# Patient Record
Sex: MALE | Race: WHITE | NOT HISPANIC OR LATINO | Employment: OTHER | URBAN - METROPOLITAN AREA
[De-identification: names, ages, dates, MRNs, and addresses within clinical notes are randomized per-mention and may not be internally consistent; named-entity substitution may affect disease eponyms.]

---

## 2017-01-05 ENCOUNTER — APPOINTMENT (OUTPATIENT)
Dept: LAB | Facility: HOSPITAL | Age: 82
End: 2017-01-05
Attending: INTERNAL MEDICINE
Payer: MEDICARE

## 2017-01-05 ENCOUNTER — TRANSCRIBE ORDERS (OUTPATIENT)
Dept: ADMINISTRATIVE | Facility: HOSPITAL | Age: 82
End: 2017-01-05

## 2017-01-05 DIAGNOSIS — Z79.899 ENCOUNTER FOR LONG-TERM (CURRENT) USE OF OTHER MEDICATIONS: ICD-10-CM

## 2017-01-05 DIAGNOSIS — R35.1 NOCTURIA: ICD-10-CM

## 2017-01-05 DIAGNOSIS — R51.9 FACIAL PAIN: ICD-10-CM

## 2017-01-05 DIAGNOSIS — J30.9 ALLERGIC RHINITIS, CAUSE UNSPECIFIED: ICD-10-CM

## 2017-01-05 DIAGNOSIS — R51.9 FACIAL PAIN: Primary | ICD-10-CM

## 2017-01-05 DIAGNOSIS — Z12.5 SPECIAL SCREENING FOR MALIGNANT NEOPLASM OF PROSTATE: ICD-10-CM

## 2017-01-05 DIAGNOSIS — E55.9 UNSPECIFIED VITAMIN D DEFICIENCY: ICD-10-CM

## 2017-01-05 LAB
25(OH)D3 SERPL-MCNC: 19.8 NG/ML (ref 30–100)
ALBUMIN SERPL BCP-MCNC: 3.9 G/DL (ref 3.5–5)
ALP SERPL-CCNC: 66 U/L (ref 46–116)
ALT SERPL W P-5'-P-CCNC: 28 U/L (ref 12–78)
ANION GAP SERPL CALCULATED.3IONS-SCNC: 11 MMOL/L (ref 4–13)
AST SERPL W P-5'-P-CCNC: 24 U/L (ref 5–45)
BASOPHILS # BLD AUTO: 0 THOUSANDS/ΜL (ref 0–0.1)
BASOPHILS NFR BLD AUTO: 0 % (ref 0–1)
BILIRUB SERPL-MCNC: 0.7 MG/DL (ref 0.2–1)
BILIRUB UR QL STRIP: NEGATIVE
BUN SERPL-MCNC: 23 MG/DL (ref 5–25)
CALCIUM SERPL-MCNC: 9.2 MG/DL (ref 8.3–10.1)
CHLORIDE SERPL-SCNC: 104 MMOL/L (ref 100–108)
CHOLEST SERPL-MCNC: 197 MG/DL (ref 50–200)
CLARITY UR: CLEAR
CO2 SERPL-SCNC: 28 MMOL/L (ref 21–32)
COLOR UR: YELLOW
CREAT SERPL-MCNC: 1.14 MG/DL (ref 0.6–1.3)
EOSINOPHIL # BLD AUTO: 0.1 THOUSAND/ΜL (ref 0–0.61)
EOSINOPHIL NFR BLD AUTO: 2 % (ref 0–6)
ERYTHROCYTE [DISTWIDTH] IN BLOOD BY AUTOMATED COUNT: 13 % (ref 11.6–15.1)
ERYTHROCYTE [SEDIMENTATION RATE] IN BLOOD: 6 MM/HOUR (ref 2–10)
GFR SERPL CREATININE-BSD FRML MDRD: >60 ML/MIN/1.73SQ M
GLUCOSE SERPL-MCNC: 118 MG/DL (ref 65–140)
GLUCOSE UR STRIP-MCNC: NEGATIVE MG/DL
HCT VFR BLD AUTO: 43.6 % (ref 42–52)
HDLC SERPL-MCNC: 60 MG/DL (ref 40–60)
HGB BLD-MCNC: 14.2 G/DL (ref 14–18)
HGB UR QL STRIP.AUTO: NEGATIVE
KETONES UR STRIP-MCNC: NEGATIVE MG/DL
LDLC SERPL CALC-MCNC: 115 MG/DL (ref 0–100)
LEUKOCYTE ESTERASE UR QL STRIP: NEGATIVE
LYMPHOCYTES # BLD AUTO: 2.8 THOUSANDS/ΜL (ref 0.6–4.47)
LYMPHOCYTES NFR BLD AUTO: 36 % (ref 14–44)
MAGNESIUM SERPL-MCNC: 1.9 MG/DL (ref 1.6–2.6)
MCH RBC QN AUTO: 31.1 PG (ref 27–31)
MCHC RBC AUTO-ENTMCNC: 32.5 G/DL (ref 31.4–37.4)
MCV RBC AUTO: 96 FL (ref 82–98)
MONOCYTES # BLD AUTO: 0.6 THOUSAND/ΜL (ref 0.17–1.22)
MONOCYTES NFR BLD AUTO: 8 % (ref 4–12)
NEUTROPHILS # BLD AUTO: 4.1 THOUSANDS/ΜL (ref 1.85–7.62)
NEUTS SEG NFR BLD AUTO: 54 % (ref 43–75)
NITRITE UR QL STRIP: NEGATIVE
NRBC BLD AUTO-RTO: 0 /100 WBCS
PH UR STRIP.AUTO: 5.5 [PH] (ref 5–9)
PLATELET # BLD AUTO: 209 THOUSANDS/UL (ref 130–400)
PMV BLD AUTO: 8.3 FL (ref 8.9–12.7)
POTASSIUM SERPL-SCNC: 3.8 MMOL/L (ref 3.5–5.3)
PROT SERPL-MCNC: 7.2 G/DL (ref 6.4–8.2)
PROT UR STRIP-MCNC: NEGATIVE MG/DL
PSA SERPL-MCNC: 4.3 NG/ML (ref 0–4)
RBC # BLD AUTO: 4.55 MILLION/UL (ref 4.7–6.1)
SODIUM SERPL-SCNC: 143 MMOL/L (ref 136–145)
SP GR UR STRIP.AUTO: >=1.03 (ref 1–1.03)
TRIGL SERPL-MCNC: 110 MG/DL
TSH SERPL DL<=0.05 MIU/L-ACNC: 3.54 UIU/ML (ref 0.36–3.74)
UROBILINOGEN UR QL STRIP.AUTO: 0.2 E.U./DL
VIT B12 SERPL-MCNC: 382 PG/ML (ref 100–900)
WBC # BLD AUTO: 7.6 THOUSAND/UL (ref 4.8–10.8)

## 2017-01-05 PROCEDURE — 85652 RBC SED RATE AUTOMATED: CPT

## 2017-01-05 PROCEDURE — 80061 LIPID PANEL: CPT | Performed by: INTERNAL MEDICINE

## 2017-01-05 PROCEDURE — 83735 ASSAY OF MAGNESIUM: CPT

## 2017-01-05 PROCEDURE — G0103 PSA SCREENING: HCPCS

## 2017-01-05 PROCEDURE — 86003 ALLG SPEC IGE CRUDE XTRC EA: CPT

## 2017-01-05 PROCEDURE — 80053 COMPREHEN METABOLIC PANEL: CPT | Performed by: INTERNAL MEDICINE

## 2017-01-05 PROCEDURE — 84443 ASSAY THYROID STIM HORMONE: CPT

## 2017-01-05 PROCEDURE — 36415 COLL VENOUS BLD VENIPUNCTURE: CPT | Performed by: INTERNAL MEDICINE

## 2017-01-05 PROCEDURE — 82785 ASSAY OF IGE: CPT

## 2017-01-05 PROCEDURE — 85025 COMPLETE CBC W/AUTO DIFF WBC: CPT

## 2017-01-05 PROCEDURE — 82607 VITAMIN B-12: CPT

## 2017-01-05 PROCEDURE — 82306 VITAMIN D 25 HYDROXY: CPT

## 2017-01-05 PROCEDURE — 81003 URINALYSIS AUTO W/O SCOPE: CPT | Performed by: INTERNAL MEDICINE

## 2017-01-06 LAB
A ALTERNATA IGE QN: <0.1 KUA/I
A FUMIGATUS IGE QN: <0.1 KUA/I
ALLERGEN COMMENT: ABNORMAL
BERMUDA GRASS IGE QN: <0.1 KUA/I
BOXELDER IGE QN: <0.1 KUA/I
C HERBARUM IGE QN: <0.1 KUA/I
CAT DANDER IGE QN: <0.1 KUA/I
CMN PIGWEED IGE QN: <0.1 KUA/I
COMMON RAGWEED IGE QN: 2.68 KUA/I
COTTONWOOD IGE QN: <0.1 KUA/I
D FARINAE IGE QN: <0.1 KUA/I
D PTERONYSS IGE QN: <0.1 KUA/I
DOG DANDER IGE QN: <0.1 KUA/I
LONDON PLANE IGE QN: <0.1 KUA/I
MOUSE URINE PROT IGE QN: <0.1 KUA/I
MT JUNIPER IGE QN: <0.1 KUA/I
MUGWORT IGE QN: <0.1 KUA/I
P NOTATUM IGE QN: <0.1 KUA/I
ROACH IGE QN: <0.1 KUA/I
SHEEP SORREL IGE QN: <0.1 KUA/I
SILVER BIRCH IGE QN: <0.1 KUA/I
TIMOTHY IGE QN: <0.1 KUA/I
TOTAL IGE SMQN RAST: 31.2 KU/L (ref 0–113)
WALNUT IGE QN: <0.1 KUA/I
WHITE ASH IGE QN: <0.1 KUA/I
WHITE ELM IGE QN: <0.1 KUA/I
WHITE MULBERRY IGE QN: <0.1 KUA/I
WHITE OAK IGE QN: <0.1 KUA/I

## 2019-10-03 NOTE — PROGRESS NOTES
Consultation - Cardiology Office  [facility]  Cely Soliman 80 y o  male MRN: [id number]  : 1928  Unit/Bed#:  Encounter: [id number]      Assessment:     1  Palpitations    2  Dyspnea on exertion    3  Skipped beats    4  Cardiac murmur    5  Anxiety    6  Gastroesophageal reflux disease without esophagitis        Discussion summary and Plan:  1  Palpitations and skipped beat  Patient has recently noted he is having palpitations and skipped beats  He is very anxious about it he has history of anxiety  He gets some dyspnea on exertion at that time  He will be scheduled for Holter monitor, echo Doppler and exercise stress test as he can walk on treadmill  Will check routine lab including  CMP, CBC along with fasting lipids and TSH  2  Dyspnea on exertion  Mostly when anxious  Exercise stress test has been ordered will check echo Doppler    3  Cardiac murmur  Patient has harsh ejection systolic murmur echo Doppler has been ordered  Probably thickened aortic valve need to rule out aortic stenosis    4  Anxiety  Patient is under lot of stress  His wife has dementia and he has to take care of her  He is stressed  Gets easily overwhelmed  Discussed with patient's family about more support at home    5  History of GERD  On on present all  6  To better assess his cardiovascular risk will check lipid profile  Along with routine labs  Plan  As above  Plan discussed with patient's daughter  Continue current Rx further plan as also of these tests become available  Thank you for your consultation  If you have any question please call me at [phone]    Counseling :  A description of the counseling  Goals and Barriers  Patient's ability to self care: Yes  Medication side effect reviewed with patient in detail and all their questions answered to their satisfaction        Primary Care Physician Requesting Consult: Jonathan Carrera MD    Reason for Consult / Principal Problem:  Palpitations and skipped beat    HPI :     Yana Guadalupe is a 80y o  year old male who was referred by primary care doctor for palpitations and skipped beat  Patient who has really no significant past medical history other than GERD has been under lot of stress as he had help take care of his wife who has multiple medical problem including dementia  He has to get up multiple time at night and has not been sleeping very well noted lately he is getting skipped beat and palpitations  He felt like he is missing heartbeats and occasionally feels faster  He denies any chest pain but he gets fatigue and tired and has some exertional shortness of breath  He has no real previous cardiac history in him  He has no recent or previous cardiac workup no history of MI no history of stents no history of any other significant cardiac problems  He get dyspnea and exertional decline monitor 2 flights of stair but no chest pain  He has used to smoke quit many many years ago  He he lives with his wife  He drinks about 6 oz of wine every day occasional coffee  No other significant dietary intakes  No stimulants  He denies any history of hypertension her blood pressure was slightly found to be elevated  He has no recent surgeries  Review of Systems   Constitutional: Positive for fatigue  Negative for activity change, chills, diaphoresis, fever and unexpected weight change  HENT: Negative for congestion  Eyes: Negative for discharge and redness  Respiratory: Negative for cough, chest tightness, shortness of breath and wheezing  Cardiovascular: Positive for palpitations  Negative for chest pain and leg swelling  Gastrointestinal: Negative for abdominal pain, diarrhea and nausea  Endocrine: Negative  Genitourinary: Negative for decreased urine volume and urgency  Musculoskeletal: Positive for arthralgias and gait problem  Skin: Negative for rash and wound  Allergic/Immunologic: Negative  Neurological: Negative for dizziness, seizures, syncope, weakness, light-headedness and headaches  Hematological: Negative  Psychiatric/Behavioral: Positive for sleep disturbance  Negative for agitation and confusion  The patient is nervous/anxious  Historical Information   History reviewed  No pertinent past medical history  History reviewed  No pertinent surgical history  Social History     Substance and Sexual Activity   Alcohol Use Yes    Alcohol/week: 1 0 standard drinks    Types: 1 Glasses of wine per week    Frequency: 4 or more times a week    Drinks per session: 1 or 2    Binge frequency: Daily or almost daily    Comment: 1 glass of wine with lunch     Social History     Substance and Sexual Activity   Drug Use Not on file     Social History     Tobacco Use   Smoking Status Never Smoker   Smokeless Tobacco Never Used     Family History:   Family History   Problem Relation Age of Onset    No Known Problems Mother     No Known Problems Father        Meds/Allergies     No Known Allergies    Current Outpatient Medications:     aspirin (ECOTRIN LOW STRENGTH) 81 mg EC tablet, Take 81 mg by mouth daily, Disp: , Rfl:     omeprazole (PriLOSEC) 20 mg delayed release capsule, Take 20 mg by mouth daily, Disp: , Rfl:     Vitals: Blood pressure 140/74, pulse 74, height 5' 6" (1 676 m), weight 67 4 kg (148 lb 11 2 oz), SpO2 99 %  Body mass index is 24 kg/m²    Vitals:    10/08/19 1303   Weight: 67 4 kg (148 lb 11 2 oz)     BP Readings from Last 3 Encounters:   10/08/19 140/74         Physical Exam    Neurologic:  Alert & oriented x 3, no new focal deficits, Not in any acute distress,  Constitutional:  Well developed, well nourished, non-toxic appearance   Eyes:  Pupil equal and reacting to light, conjunctiva normal, No JVP, No LNP   HENT:  Atraumatic, oropharynx moist, Neck- normal range of motion, no tenderness,  Neck supple   Respiratory:  Bilateral air entry, mostly clear to auscultation  Cardiovascular: S1-S2 regular with a 3/6 ejection systolic murmur and S4 is present  GI:  Soft, nondistended, normal bowel sounds, nontender, no hepatosplenomegaly appreciated  Musculoskeletal:  No edema, no tenderness, no deformities  Skin:  Well hydrated, no rash   Lymphatic:  No lymphadenopathy noted   Extremities:  No edema and distal pulses are present    Diagnostic Studies Review Cardio:  None recent    EKG:  Twelve lead EKG 10/08/2019 shows normal sinus rhythm  LVH by voltage  Heart rate 72 beats per minute  No other significant ST changes  Lab Review   Lab Results   Component Value Date    WBC 7 60 01/05/2017    HGB 14 2 01/05/2017    HCT 43 6 01/05/2017    MCV 96 01/05/2017    RDW 13 0 01/05/2017     01/05/2017     BMP:  Lab Results   Component Value Date    SODIUM 143 01/05/2017    K 3 8 01/05/2017     01/05/2017    CO2 28 01/05/2017    BUN 23 01/05/2017    CREATININE 1 14 01/05/2017    GLUC 118 01/05/2017    CALCIUM 9 2 01/05/2017    EGFR >60 0 01/05/2017    MG 1 9 01/05/2017     LFT:  Lab Results   Component Value Date    AST 24 01/05/2017    ALT 28 01/05/2017    ALKPHOS 66 01/05/2017    TP 7 2 01/05/2017    ALB 3 9 01/05/2017      Lab Results   Component Value Date    KZC8QTSZCKGU 3 539 01/05/2017       Lipid Profile:   Lab Results   Component Value Date    CHOLESTEROL 197 01/05/2017    HDL 60 01/05/2017    LDLCALC 115 (H) 01/05/2017    TRIG 110 01/05/2017     Lab Results   Component Value Date    CHOLESTEROL 197 01/05/2017           Dr Gordon Saez MD [facility] [place]      "This note has been constructed using a voice recognition system  Therefore there may be syntax, spelling, and/or grammatical errors   Please call if you have any questions  "

## 2019-10-08 ENCOUNTER — OFFICE VISIT (OUTPATIENT)
Dept: CARDIOLOGY CLINIC | Facility: CLINIC | Age: 84
End: 2019-10-08
Payer: MEDICARE

## 2019-10-08 VITALS
SYSTOLIC BLOOD PRESSURE: 140 MMHG | OXYGEN SATURATION: 99 % | WEIGHT: 148.7 LBS | HEIGHT: 66 IN | HEART RATE: 74 BPM | BODY MASS INDEX: 23.9 KG/M2 | DIASTOLIC BLOOD PRESSURE: 74 MMHG

## 2019-10-08 DIAGNOSIS — R00.2 PALPITATIONS: ICD-10-CM

## 2019-10-08 DIAGNOSIS — R06.00 DYSPNEA ON EXERTION: ICD-10-CM

## 2019-10-08 DIAGNOSIS — F41.9 ANXIETY: ICD-10-CM

## 2019-10-08 DIAGNOSIS — K21.9 GASTROESOPHAGEAL REFLUX DISEASE WITHOUT ESOPHAGITIS: ICD-10-CM

## 2019-10-08 DIAGNOSIS — R01.1 CARDIAC MURMUR: ICD-10-CM

## 2019-10-08 DIAGNOSIS — I45.9 SKIPPED BEATS: ICD-10-CM

## 2019-10-08 PROBLEM — R06.09 DYSPNEA ON EXERTION: Status: ACTIVE | Noted: 2019-10-08

## 2019-10-08 PROCEDURE — 99205 OFFICE O/P NEW HI 60 MIN: CPT | Performed by: INTERNAL MEDICINE

## 2019-10-08 PROCEDURE — 93000 ELECTROCARDIOGRAM COMPLETE: CPT | Performed by: INTERNAL MEDICINE

## 2019-10-08 RX ORDER — OMEPRAZOLE 20 MG/1
20 CAPSULE, DELAYED RELEASE ORAL DAILY
COMMUNITY

## 2019-10-08 RX ORDER — ASPIRIN 81 MG/1
81 TABLET ORAL DAILY
COMMUNITY

## 2019-11-13 ENCOUNTER — HOSPITAL ENCOUNTER (OUTPATIENT)
Dept: NON INVASIVE DIAGNOSTICS | Facility: HOSPITAL | Age: 84
Discharge: HOME | End: 2019-11-13
Attending: INTERNAL MEDICINE
Payer: MEDICARE

## 2019-11-13 ENCOUNTER — APPOINTMENT (OUTPATIENT)
Dept: NON INVASIVE DIAGNOSTICS | Facility: HOSPITAL | Age: 84
End: 2019-11-13
Attending: INTERNAL MEDICINE
Payer: MEDICARE

## 2019-11-13 DIAGNOSIS — I45.9 SKIPPED BEATS: ICD-10-CM

## 2019-11-13 DIAGNOSIS — R00.2 PALPITATIONS: ICD-10-CM

## 2019-11-13 DIAGNOSIS — R06.00 DYSPNEA ON EXERTION: ICD-10-CM

## 2019-11-13 DIAGNOSIS — R01.1 CARDIAC MURMUR: ICD-10-CM

## 2019-11-13 PROCEDURE — 93306 TTE W/DOPPLER COMPLETE: CPT

## 2019-11-13 PROCEDURE — 93225 XTRNL ECG REC<48 HRS REC: CPT

## 2019-11-13 PROCEDURE — 93226 XTRNL ECG REC<48 HR SCAN A/R: CPT

## 2019-11-13 PROCEDURE — 93306 TTE W/DOPPLER COMPLETE: CPT | Performed by: INTERNAL MEDICINE

## 2019-11-14 ENCOUNTER — TELEPHONE (OUTPATIENT)
Dept: CARDIOLOGY CLINIC | Facility: CLINIC | Age: 84
End: 2019-11-14

## 2019-11-14 NOTE — TELEPHONE ENCOUNTER
----- Message from Colletta Croissant, MD sent at 11/14/2019  8:44 AM EST -----  Patient's echo shows normal LV systolic function  No significant valvular disease  Patient can keep an appointment  Please call patient about echo report

## 2019-11-27 PROCEDURE — 93227 XTRNL ECG REC<48 HR R&I: CPT | Performed by: INTERNAL MEDICINE

## 2019-12-02 ENCOUNTER — TELEPHONE (OUTPATIENT)
Dept: CARDIOLOGY CLINIC | Facility: CLINIC | Age: 84
End: 2019-12-02

## 2019-12-02 NOTE — TELEPHONE ENCOUNTER
Patient's Holter was normal with no evidence of any significant tachy-mariam arrhythmias  No atrial fibrillation noted

## 2019-12-02 NOTE — TELEPHONE ENCOUNTER
----- Message from Anitra Owen MD sent at 11/28/2019  1:33 PM EST -----  Pt's Holter shows no significant arrhthymias  Pt can keep his appointment  Please call patient

## 2019-12-16 ENCOUNTER — TELEPHONE (OUTPATIENT)
Dept: CARDIOLOGY CLINIC | Facility: CLINIC | Age: 84
End: 2019-12-16

## 2019-12-16 ENCOUNTER — LAB (OUTPATIENT)
Dept: LAB | Facility: CLINIC | Age: 84
End: 2019-12-16
Payer: MEDICARE

## 2019-12-16 DIAGNOSIS — R06.00 DYSPNEA ON EXERTION: ICD-10-CM

## 2019-12-16 DIAGNOSIS — R00.2 PALPITATIONS: ICD-10-CM

## 2019-12-16 LAB
ALBUMIN SERPL BCP-MCNC: 3.7 G/DL (ref 3.5–5)
ALP SERPL-CCNC: 60 U/L (ref 46–116)
ALT SERPL W P-5'-P-CCNC: 20 U/L (ref 12–78)
ANION GAP SERPL CALCULATED.3IONS-SCNC: 6 MMOL/L (ref 4–13)
AST SERPL W P-5'-P-CCNC: 18 U/L (ref 5–45)
BASOPHILS # BLD AUTO: 0.03 THOUSANDS/ΜL (ref 0–0.1)
BASOPHILS NFR BLD AUTO: 0 % (ref 0–1)
BILIRUB SERPL-MCNC: 0.69 MG/DL (ref 0.2–1)
BUN SERPL-MCNC: 22 MG/DL (ref 5–25)
CALCIUM SERPL-MCNC: 9.2 MG/DL (ref 8.3–10.1)
CHLORIDE SERPL-SCNC: 107 MMOL/L (ref 100–108)
CHOLEST SERPL-MCNC: 178 MG/DL (ref 50–200)
CO2 SERPL-SCNC: 28 MMOL/L (ref 21–32)
CREAT SERPL-MCNC: 1.16 MG/DL (ref 0.6–1.3)
EOSINOPHIL # BLD AUTO: 0.12 THOUSAND/ΜL (ref 0–0.61)
EOSINOPHIL NFR BLD AUTO: 2 % (ref 0–6)
ERYTHROCYTE [DISTWIDTH] IN BLOOD BY AUTOMATED COUNT: 13.1 % (ref 11.6–15.1)
GFR SERPL CREATININE-BSD FRML MDRD: 55 ML/MIN/1.73SQ M
GLUCOSE P FAST SERPL-MCNC: 113 MG/DL (ref 65–99)
HCT VFR BLD AUTO: 41.7 % (ref 36.5–49.3)
HDLC SERPL-MCNC: 63 MG/DL
HGB BLD-MCNC: 13.6 G/DL (ref 12–17)
IMM GRANULOCYTES # BLD AUTO: 0.02 THOUSAND/UL (ref 0–0.2)
IMM GRANULOCYTES NFR BLD AUTO: 0 % (ref 0–2)
LDLC SERPL CALC-MCNC: 98 MG/DL (ref 0–100)
LYMPHOCYTES # BLD AUTO: 2.71 THOUSANDS/ΜL (ref 0.6–4.47)
LYMPHOCYTES NFR BLD AUTO: 40 % (ref 14–44)
MCH RBC QN AUTO: 32.5 PG (ref 26.8–34.3)
MCHC RBC AUTO-ENTMCNC: 32.6 G/DL (ref 31.4–37.4)
MCV RBC AUTO: 100 FL (ref 82–98)
MONOCYTES # BLD AUTO: 0.67 THOUSAND/ΜL (ref 0.17–1.22)
MONOCYTES NFR BLD AUTO: 10 % (ref 4–12)
NEUTROPHILS # BLD AUTO: 3.31 THOUSANDS/ΜL (ref 1.85–7.62)
NEUTS SEG NFR BLD AUTO: 48 % (ref 43–75)
NONHDLC SERPL-MCNC: 115 MG/DL
NRBC BLD AUTO-RTO: 0 /100 WBCS
PLATELET # BLD AUTO: 196 THOUSANDS/UL (ref 149–390)
PMV BLD AUTO: 9.7 FL (ref 8.9–12.7)
POTASSIUM SERPL-SCNC: 3.9 MMOL/L (ref 3.5–5.3)
PROT SERPL-MCNC: 7 G/DL (ref 6.4–8.2)
RBC # BLD AUTO: 4.19 MILLION/UL (ref 3.88–5.62)
SODIUM SERPL-SCNC: 141 MMOL/L (ref 136–145)
TRIGL SERPL-MCNC: 84 MG/DL
TSH SERPL DL<=0.05 MIU/L-ACNC: 3.94 UIU/ML (ref 0.36–3.74)
WBC # BLD AUTO: 6.86 THOUSAND/UL (ref 4.31–10.16)

## 2019-12-16 PROCEDURE — 85025 COMPLETE CBC W/AUTO DIFF WBC: CPT

## 2019-12-16 PROCEDURE — 84443 ASSAY THYROID STIM HORMONE: CPT

## 2019-12-16 PROCEDURE — 36415 COLL VENOUS BLD VENIPUNCTURE: CPT

## 2019-12-16 PROCEDURE — 80061 LIPID PANEL: CPT

## 2019-12-16 PROCEDURE — 80053 COMPREHEN METABOLIC PANEL: CPT

## 2019-12-16 NOTE — TELEPHONE ENCOUNTER
----- Message from Ronel Russ MD sent at 12/16/2019  4:10 PM EST -----  Patient labs are acceptable  Continue same medications  Patient is advised to follow up  appointment regularly  Please call patient about the  about results

## 2019-12-17 ENCOUNTER — TELEPHONE (OUTPATIENT)
Dept: CARDIOLOGY CLINIC | Facility: CLINIC | Age: 84
End: 2019-12-17

## 2019-12-17 NOTE — TELEPHONE ENCOUNTER
----- Message from aMuri White MD sent at 12/17/2019  9:53 AM EST -----  Patient labs are acceptable  Continue same medications  Patient is advised to follow up  appointment regularly  Please call patient about the  about results

## 2020-01-08 NOTE — PROGRESS NOTES
Progress Note- Cardiology Office   [facility]  Tram Roldan 80 y o  male MRN: [id number]  : 1928  Unit/Bed#:  Encounter: [id number]      Assessment:     1  Dyspnea on exertion    2  Cardiac murmur    3  Palpitations    4  Anxiety    5  Gastroesophageal reflux disease without esophagitis        Discussion summary and Plan:  1  Palpitations and skipped beat  Holter monitor shows patient has few PVCs  Lifestyle modification as also helped  He is anxious about it  Echo Doppler shows normal LV systolic function  He did not do stress test labs reviewed electrolytes were acceptable  2  Dyspnea on exertion  Better now  Most likely secondary to anxiety  Patient does not want to stress test echo shows normal LV systolic function blood test and Holter finding discussed with him  3  Cardiac murmur  Patient has mild valvular disease with thickened aortic valve mild MR mild AI normal LV systolic function  Will continue to monitor  4  Anxiety  Patient is under lot of stress  His wife has dementia and he has to take care of her  He is stressed  Gets easily overwhelmed  Discussed with patient's family about more support at home    5  History of GERD  On on present all  6  To better assess his cardiovascular risk will check lipid profile  Cholesterol profile discussed with the patient he is reluctant to take further medications  Patient's result discussed with him  Thank you for your consultation  If you have any question please call me at [phone]    Counseling :  A description of the counseling  Goals and Barriers  Patient's ability to self care: Yes  Medication side effect reviewed with patient in detail and all their questions answered to their satisfaction        Primary Care Physician : Ezequiel Gomez MD    HPI :     Tram Roldan is a 80y o  year old male who was referred by primary care doctor for palpitations and skipped beat   Patient who has really no significant past medical history other than GERD has been under lot of stress as he had help take care of his wife who has multiple medical problem including dementia  He has to get up multiple time at night and has not been sleeping very well noted lately he is getting skipped beat and palpitations  He felt like he is missing heartbeats and occasionally feels faster  He denies any chest pain but he gets fatigue and tired and has some exertional shortness of breath  He has no real previous cardiac history in him  He has no recent or previous cardiac workup no history of MI no history of stents no history of any other significant cardiac problems  He get dyspnea and exertional decline monitor 2 flights of stair but no chest pain  He has used to smoke quit many many years ago  He he lives with his wife  He drinks about 6 oz of wine every day occasional coffee  No other significant dietary intakes  No stimulants  He denies any history of hypertension her blood pressure was slightly found to be elevated  He has no recent surgeries  01/13/2020  Above reviewed  Patient came for follow-up  He had his echo and Holter done  He denies any new complaint  He still get occasionally some skipped beat  He has some decent exertion particularly when he climbs 2 flights of stair which is not changed  He lives with his wife  He drinks about 6 oz of wine and occasional coffee  His Holter monitor shows few PVCs echo shows normal LV systolic function he does not want do stress test   No nausea no vomiting no fever no chills no PND no orthopnea no other significant complaint  He has to wake up at night to take her wife to the bathroom, which makes harder him to sleep otherwise he is doing well  His blood test December 2019 were acceptable  Review of Systems   Constitutional: Negative for activity change, chills, diaphoresis, fever and unexpected weight change     HENT: Negative for congestion  Eyes: Negative for discharge and redness  Respiratory: Negative for cough, chest tightness, shortness of breath and wheezing  Cardiovascular: Positive for palpitations  Negative for chest pain and leg swelling  Occasional   Gastrointestinal: Negative for abdominal pain, diarrhea and nausea  Endocrine: Negative  Genitourinary: Negative for decreased urine volume and urgency  Musculoskeletal: Positive for arthralgias and back pain  Negative for gait problem  Skin: Negative for rash and wound  Allergic/Immunologic: Negative  Neurological: Negative for dizziness, seizures, syncope, weakness, light-headedness and headaches  Hematological: Negative  Psychiatric/Behavioral: Negative for agitation and confusion  The patient is not nervous/anxious  Historical Information   History reviewed  No pertinent past medical history  History reviewed  No pertinent surgical history  Social History     Substance and Sexual Activity   Alcohol Use Yes    Alcohol/week: 1 0 standard drinks    Types: 1 Glasses of wine per week    Frequency: 4 or more times a week    Drinks per session: 1 or 2    Binge frequency: Daily or almost daily    Comment: 1 glass of wine with lunch     Social History     Substance and Sexual Activity   Drug Use Not on file     Social History     Tobacco Use   Smoking Status Never Smoker   Smokeless Tobacco Never Used     Family History:   Family History   Problem Relation Age of Onset    No Known Problems Mother     No Known Problems Father        Meds/Allergies     No Known Allergies    Current Outpatient Medications:     aspirin (ECOTRIN LOW STRENGTH) 81 mg EC tablet, Take 81 mg by mouth daily, Disp: , Rfl:     omeprazole (PriLOSEC) 20 mg delayed release capsule, Take 20 mg by mouth daily, Disp: , Rfl:     Vitals: Blood pressure 130/64, pulse 67, height 5' 6" (1 676 m), weight 68 2 kg (150 lb 4 8 oz), SpO2 98 %      Body mass index is 24 26 kg/m²  Vitals:    01/13/20 0950   Weight: 68 2 kg (150 lb 4 8 oz)     BP Readings from Last 3 Encounters:   01/13/20 130/64   10/08/19 140/74         Physical Exam   Constitutional: He is oriented to person, place, and time  He appears well-developed and well-nourished  No distress  HENT:   Head: Normocephalic and atraumatic  Eyes: Pupils are equal, round, and reactive to light  Neck: Neck supple  No JVD present  No tracheal deviation present  No thyromegaly present  Cardiovascular: Normal rate, regular rhythm, S1 normal, S2 normal and intact distal pulses  Exam reveals no gallop, no S3, no S4, no distant heart sounds and no friction rub  Murmur heard  Systolic (ejection) murmur is present with a grade of 2/6  S1-S2 2/6 as murmur   Pulmonary/Chest: Effort normal and breath sounds normal  No respiratory distress  He has no wheezes  He has no rales  He exhibits no tenderness  Abdominal: Soft  Bowel sounds are normal  He exhibits no distension  There is no tenderness  Musculoskeletal: He exhibits no edema or deformity  Neurological: He is alert and oriented to person, place, and time  Skin: Skin is warm and dry  No rash noted  He is not diaphoretic  No pallor  Psychiatric: He has a normal mood and affect  His behavior is normal  Judgment normal          Diagnostic Studies Review Cardio:    Echo Doppler  Echo Doppler done 11/13/2019 shows EF 55 60%, mild LVH, grade 1 diastolic dysfunction, mild aortic regurgitation mild MR and PA pressure was normal     Holter monitor  Holter monitor shows no evidence of atrial fibrillation flutter  There were few episodes of PVCs which were asymptomatic  Stress test   Patient refused to do stress test     EKG:  Twelve lead EKG 10/08/2019 shows normal sinus rhythm  LVH by voltage  Heart rate 72 beats per minute  No other significant ST changes      Lab Review   Lab Results   Component Value Date    WBC 6 86 12/16/2019    HGB 13 6 12/16/2019    HCT 41 7 12/16/2019     (H) 12/16/2019    RDW 13 1 12/16/2019     12/16/2019     BMP:  Lab Results   Component Value Date    SODIUM 141 12/16/2019    K 3 9 12/16/2019     12/16/2019    CO2 28 12/16/2019    BUN 22 12/16/2019    CREATININE 1 16 12/16/2019    GLUC 118 01/05/2017    GLUF 113 (H) 12/16/2019    CALCIUM 9 2 12/16/2019    EGFR 55 12/16/2019    MG 1 9 01/05/2017     LFT:  Lab Results   Component Value Date    AST 18 12/16/2019    ALT 20 12/16/2019    ALKPHOS 60 12/16/2019    TP 7 0 12/16/2019    ALB 3 7 12/16/2019      Lab Results   Component Value Date    [id number] 3 940 (H) 12/16/2019       Lipid Profile:   Lab Results   Component Value Date    CHOLESTEROL 178 12/16/2019    HDL 63 12/16/2019    LDLCALC 98 12/16/2019    TRIG 84 12/16/2019     Lab Results   Component Value Date    CHOLESTEROL 178 12/16/2019    CHOLESTEROL 197 01/05/2017           Dr Becca Aldrich MD [facility] [place]      "This note has been constructed using a voice recognition system  Therefore there may be syntax, spelling, and/or grammatical errors   Please call if you have any questions  "

## 2020-01-13 ENCOUNTER — OFFICE VISIT (OUTPATIENT)
Dept: CARDIOLOGY CLINIC | Facility: CLINIC | Age: 85
End: 2020-01-13
Payer: MEDICARE

## 2020-01-13 VITALS
HEART RATE: 67 BPM | WEIGHT: 150.3 LBS | BODY MASS INDEX: 24.15 KG/M2 | OXYGEN SATURATION: 98 % | HEIGHT: 66 IN | DIASTOLIC BLOOD PRESSURE: 64 MMHG | SYSTOLIC BLOOD PRESSURE: 130 MMHG

## 2020-01-13 DIAGNOSIS — R01.1 CARDIAC MURMUR: ICD-10-CM

## 2020-01-13 DIAGNOSIS — R00.2 PALPITATIONS: ICD-10-CM

## 2020-01-13 DIAGNOSIS — F41.9 ANXIETY: ICD-10-CM

## 2020-01-13 DIAGNOSIS — K21.9 GASTROESOPHAGEAL REFLUX DISEASE WITHOUT ESOPHAGITIS: ICD-10-CM

## 2020-01-13 DIAGNOSIS — R06.00 DYSPNEA ON EXERTION: ICD-10-CM

## 2020-01-13 PROCEDURE — 99214 OFFICE O/P EST MOD 30 MIN: CPT | Performed by: INTERNAL MEDICINE

## 2020-07-06 NOTE — PROGRESS NOTES
Progress Note- Cardiology Office   [facility]  Camila Mcrae 80 y o  male MRN: [id number]  : 1928  Unit/Bed#:  Encounter: [id number]      Assessment:     1  Dyspnea on exertion    2  Palpitations    3  Cardiac murmur    4  Skipped beats    5  Anxiety        Discussion summary and Plan:  1  Palpitations and skipped beat  Holter monitor shows patient has few PVCs  Lifestyle modification as also helped  He is anxious about it  Echo Doppler shows normal LV systolic function  He does not want to stress test occasional palpitations noted    2  Dyspnea on exertion  Better now most likely secondary anxiety  Patient does not want to stress test   He has decently active he does all the home work without any problem  3  Cardiac murmur  Patient has mild valvular disease with thickened aortic valve mild MR mild AI normal LV systolic function  Will continue to monitor    4  Anxiety  Patient is under lot of stress  His wife has dementia and he has to take care of her  He is stressed  Gets easily overwhelmed  Discussed with patient's family about more support at home    5  History of GERD  On on present all  6  To better assess his cardiovascular risk will check lipid profile  Cholesterol profile discussed with the patient   Patient does not want take statin therapy  Patient's results discussed with him  Thank you for your consultation  If you have any question please call me at [phone]    Counseling :  A description of the counseling  Goals and Barriers  Patient's ability to self care: Yes  Medication side effect reviewed with patient in detail and all their questions answered to their satisfaction  Primary Care Physician : Cierra Robles MD    HPI :     Camila Mcrae is a 80y o  year old male who was referred by primary care doctor for palpitations and skipped beat    Patient who has really no significant past medical history other than GERD has been under lot of stress as he had help take care of his wife who has multiple medical problem including dementia  He has to get up multiple time at night and has not been sleeping very well noted lately he is getting skipped beat and palpitations  He felt like he is missing heartbeats and occasionally feels faster  He denies any chest pain but he gets fatigue and tired and has some exertional shortness of breath  He has no real previous cardiac history in him  He has no recent or previous cardiac workup no history of MI no history of stents no history of any other significant cardiac problems  He get dyspnea and exertional decline monitor 2 flights of stair but no chest pain  He has used to smoke quit many many years ago  He he lives with his wife  He drinks about 6 oz of wine every day occasional coffee  No other significant dietary intakes  No stimulants  He denies any history of hypertension her blood pressure was slightly found to be elevated  He has no recent surgeries  07/09/2020  Above reviewed  Patient came for follow-up he is doing well  Denies any new complaint occasionally get skipped beat  His main complaint is that he has to work hard at home and has some back pains  Gait occasional exertional dyspnea which is not changed  He was supposed to have a exercise stress test which she did not do and does not want to do it echo shows he has a normal LV systolic function Holter shows few PACs and PVCs  He has normal LV systolic function  Denies any fever any chills any nausea and vomiting  He says he has to wake up at night to take his wife to the bathroom which makes him hard to go back to sleep  Otherwise he is doing well his vitals are stable  Today heart rate 72 beats per minute on EKG and LVH by voltage  His blood test from December 2019 reviewed      Review of Systems   Constitutional: Negative for activity change, chills, diaphoresis, fever and unexpected weight change  HENT: Negative for congestion  Eyes: Negative for discharge and redness  Respiratory: Negative for cough, chest tightness, shortness of breath and wheezing  Cardiovascular: Negative  Negative for chest pain, palpitations and leg swelling  Gastrointestinal: Negative for abdominal pain, diarrhea and nausea  Occasional skipped beats   Endocrine: Negative  Genitourinary: Negative for decreased urine volume and urgency  Musculoskeletal: Positive for arthralgias  Negative for back pain and gait problem  Skin: Negative for rash and wound  Allergic/Immunologic: Negative  Neurological: Negative for dizziness, seizures, syncope, weakness, light-headedness and headaches  Hematological: Negative  Psychiatric/Behavioral: Negative for agitation and confusion  The patient is nervous/anxious  Historical Information   History reviewed  No pertinent past medical history  History reviewed  No pertinent surgical history    Social History     Substance and Sexual Activity   Alcohol Use Yes    Alcohol/week: 1 0 standard drinks    Types: 1 Glasses of wine per week    Frequency: 4 or more times a week    Drinks per session: 1 or 2    Binge frequency: Daily or almost daily    Comment: 1 glass of wine with lunch     Social History     Substance and Sexual Activity   Drug Use Not on file     Social History     Tobacco Use   Smoking Status Never Smoker   Smokeless Tobacco Never Used     Family History:   Family History   Problem Relation Age of Onset    No Known Problems Mother     No Known Problems Father        Meds/Allergies     No Known Allergies    Current Outpatient Medications:     aspirin (ECOTRIN LOW STRENGTH) 81 mg EC tablet, Take 81 mg by mouth daily, Disp: , Rfl:     omeprazole (PriLOSEC) 20 mg delayed release capsule, Take 20 mg by mouth daily, Disp: , Rfl:     Vitals: Blood pressure 120/60, pulse 76, temperature 98 8 °F (37 1 °C), temperature source Temporal, height 5' 6" (1 676 m), weight 64 kg (141 lb), SpO2 98 %  Body mass index is 22 76 kg/m²  Vitals:    07/09/20 1522   Weight: 64 kg (141 lb)     BP Readings from Last 3 Encounters:   07/09/20 120/60   01/13/20 130/64   10/08/19 140/74         Physical Exam   Constitutional: He is oriented to person, place, and time  He appears well-developed and well-nourished  No distress  HENT:   Head: Normocephalic and atraumatic  Eyes: Pupils are equal, round, and reactive to light  Neck: Neck supple  No JVD present  No tracheal deviation present  No thyromegaly present  Cardiovascular: Normal rate, regular rhythm, S1 normal and S2 normal  Exam reveals no gallop, no S3, no S4, no distant heart sounds and no friction rub  Murmur heard  Systolic (ejection) murmur is present with a grade of 2/6  S1-S2 regular with 2/6 as murmur S4 present   Pulmonary/Chest: Effort normal and breath sounds normal  No respiratory distress  He has no wheezes  He has no rales  He exhibits no tenderness  Abdominal: Soft  Bowel sounds are normal  He exhibits no distension  There is no tenderness  Musculoskeletal: He exhibits no edema or deformity  Neurological: He is alert and oriented to person, place, and time  Skin: Skin is warm and dry  No rash noted  He is not diaphoretic  No pallor  Psychiatric: He has a normal mood and affect  His behavior is normal  Judgment normal          Diagnostic Studies Review Cardio:    Echo Doppler  Echo Doppler done 11/13/2019 shows EF 55 60%, mild LVH, grade 1 diastolic dysfunction, mild aortic regurgitation mild MR and PA pressure was normal     Holter monitor  Holter monitor shows no evidence of atrial fibrillation flutter  There were few episodes of PVCs which were asymptomatic  Stress test   Patient refused to do stress test     EKG:  Twelve lead EKG 10/08/2019 shows normal sinus rhythm  LVH by voltage  Heart rate 72 beats per minute  No other significant ST changes  Twelve lead EKG 07/09/2020 shows normal sinus rhythm  LVH by voltage heart rate 72 beats per minute no change from old EKG  Lab Review   Lab Results   Component Value Date    WBC 6 86 12/16/2019    HGB 13 6 12/16/2019    HCT 41 7 12/16/2019     (H) 12/16/2019    RDW 13 1 12/16/2019     12/16/2019     BMP:  Lab Results   Component Value Date    SODIUM 141 12/16/2019    K 3 9 12/16/2019     12/16/2019    CO2 28 12/16/2019    BUN 22 12/16/2019    CREATININE 1 16 12/16/2019    GLUC 118 01/05/2017    GLUF 113 (H) 12/16/2019    CALCIUM 9 2 12/16/2019    EGFR 55 12/16/2019    MG 1 9 01/05/2017     LFT:  Lab Results   Component Value Date    AST 18 12/16/2019    ALT 20 12/16/2019    ALKPHOS 60 12/16/2019    TP 7 0 12/16/2019    ALB 3 7 12/16/2019      Lab Results   Component Value Date    [id number] 3 940 (H) 12/16/2019       Lipid Profile:   Lab Results   Component Value Date    CHOLESTEROL 178 12/16/2019    HDL 63 12/16/2019    LDLCALC 98 12/16/2019    TRIG 84 12/16/2019     Lab Results   Component Value Date    CHOLESTEROL 178 12/16/2019    CHOLESTEROL 197 01/05/2017           Dr Ashley Travis MD [facility] [place]      "This note has been constructed using a voice recognition system  Therefore there may be syntax, spelling, and/or grammatical errors   Please call if you have any questions  "

## 2020-07-09 ENCOUNTER — OFFICE VISIT (OUTPATIENT)
Dept: CARDIOLOGY CLINIC | Facility: CLINIC | Age: 85
End: 2020-07-09
Payer: MEDICARE

## 2020-07-09 VITALS
OXYGEN SATURATION: 98 % | HEIGHT: 66 IN | SYSTOLIC BLOOD PRESSURE: 120 MMHG | WEIGHT: 141 LBS | BODY MASS INDEX: 22.66 KG/M2 | HEART RATE: 76 BPM | TEMPERATURE: 98.8 F | DIASTOLIC BLOOD PRESSURE: 60 MMHG

## 2020-07-09 DIAGNOSIS — R00.2 PALPITATIONS: ICD-10-CM

## 2020-07-09 DIAGNOSIS — F41.9 ANXIETY: ICD-10-CM

## 2020-07-09 DIAGNOSIS — I45.9 SKIPPED BEATS: ICD-10-CM

## 2020-07-09 DIAGNOSIS — R01.1 CARDIAC MURMUR: ICD-10-CM

## 2020-07-09 DIAGNOSIS — R06.00 DYSPNEA ON EXERTION: ICD-10-CM

## 2020-07-09 PROCEDURE — 99214 OFFICE O/P EST MOD 30 MIN: CPT | Performed by: INTERNAL MEDICINE

## 2020-07-09 PROCEDURE — 93000 ELECTROCARDIOGRAM COMPLETE: CPT | Performed by: INTERNAL MEDICINE

## 2021-01-12 NOTE — PROGRESS NOTES
Progress Note- Cardiology Office   [facility]  Tristian Ayers 80 y o  male MRN: [id number]  : 1928  Unit/Bed#:  Encounter: [id number]      Assessment:     1  Dyspnea on exertion    2  Palpitations    3  Cardiac murmur    4  Anxiety        Discussion summary and Plan:  1  Palpitations and skipped beat  Holter monitor shows patient has few PVCs  Lifestyle modification as also helped  He is anxious about it  Echo Doppler shows normal LV systolic function  He does not want to stress test or any other cardiac workup  2  Dyspnea on exertion  Better now most likely secondary anxiety  Patient does not want to stress test     He has decently active  He does home work without any problems  3  Cardiac murmur  Patient has mild valvular disease with thickened aortic valve mild MR mild AI normal LV systolic function  Echo from 2019 reviewed    4  Anxiety  Patient is under lot of stress  His wife has dementia and he has to take care of her  He is stressed  Gets easily overwhelmed  Discussed with patient's family about more support at home    5  History of GERD  On on present all  6   Dyslipidemia  Borderline cholesterol but patient does not want take any statin therapy  Patient's results discussed with him  Thank you for your consultation  If you have any question please call me at [phone]    Counseling :  A description of the counseling  Goals and Barriers  Patient's ability to self care: Yes  Medication side effect reviewed with patient in detail and all their questions answered to their satisfaction  Primary Care Physician : Rosalba Nava MD    HPI :     Tristian Ayers is a 80y o  year old male who was referred by primary care doctor for palpitations and skipped beat    Patient who has really no significant past medical history other than GERD has been under lot of stress as he had help take care of his wife who has multiple medical problem including dementia  He has to get up multiple time at night and has not been sleeping very well noted lately he is getting skipped beat and palpitations  He felt like he is missing heartbeats and occasionally feels faster  He denies any chest pain but he gets fatigue and tired and has some exertional shortness of breath  He has no real previous cardiac history in him  He has no recent or previous cardiac workup no history of MI no history of stents no history of any other significant cardiac problems  He get dyspnea and exertional decline monitor 2 flights of stair but no chest pain  He has used to smoke quit many many years ago  He he lives with his wife  He drinks about 6 oz of wine every day occasional coffee  No other significant dietary intakes  No stimulants  He denies any history of hypertension her blood pressure was slightly found to be elevated  He has no recent surgeries  07/09/2020  Above reviewed  Patient came for follow-up he is doing well  Denies any new complaint occasionally get skipped beat  His main complaint is that he has to work hard at home and has some back pains  Gait occasional exertional dyspnea which is not changed  He was supposed to have a exercise stress test which she did not do and does not want to do it echo shows he has a normal LV systolic function Holter shows few PACs and PVCs  He has normal LV systolic function  Denies any fever any chills any nausea and vomiting  He says he has to wake up at night to take his wife to the bathroom which makes him hard to go back to sleep  Otherwise he is doing well his vitals are stable  Today heart rate 72 beats per minute on EKG and LVH by voltage  His blood test from December 2019 reviewed  01/15/2021  Above reviewed  Patient came for follow-up  Has occasional skipped beat and has occasional exertional shortness of breath    He does not want to exercise stress test or nuclear stress test   He has medical history significant for anxiety, mild chronic exertional shortness of breath which is chronic history of PACs and PVC and normal LV systolic function  He also does not want take any statin therapy  He has provides care to his wife  Today heart rate 76 beats per minute and few PACs noted no nausea no vomiting no fever no chills his blood test from 2019 reviewed with him  He is scheduled to have repeat blood test with his primary care doctor  Review of Systems   Constitutional: Negative for activity change, chills, diaphoresis, fever and unexpected weight change  HENT: Negative for congestion  Eyes: Negative for discharge and redness  Respiratory: Negative for cough, chest tightness, shortness of breath and wheezing  Cardiovascular: Positive for palpitations  Negative for chest pain and leg swelling  Occasional skipped beat   Gastrointestinal: Negative for abdominal pain, diarrhea and nausea  Endocrine: Negative  Genitourinary: Negative for decreased urine volume and urgency  Musculoskeletal: Positive for arthralgias  Negative for back pain and gait problem  Skin: Negative for rash and wound  Allergic/Immunologic: Negative  Neurological: Negative for dizziness, seizures, syncope, weakness, light-headedness and headaches  Hematological: Negative  Psychiatric/Behavioral: Negative for agitation and confusion  The patient is nervous/anxious  Historical Information   History reviewed  No pertinent past medical history  History reviewed  No pertinent surgical history    Social History     Substance and Sexual Activity   Alcohol Use Yes    Alcohol/week: 1 0 standard drinks    Types: 1 Glasses of wine per week    Frequency: 4 or more times a week    Drinks per session: 1 or 2    Binge frequency: Daily or almost daily    Comment: 1 glass of wine with lunch     Social History     Substance and Sexual Activity   Drug Use Not on file Social History     Tobacco Use   Smoking Status Never Smoker   Smokeless Tobacco Never Used     Family History:   Family History   Problem Relation Age of Onset    No Known Problems Mother     No Known Problems Father        Meds/Allergies     No Known Allergies    Current Outpatient Medications:     aspirin (ECOTRIN LOW STRENGTH) 81 mg EC tablet, Take 81 mg by mouth daily, Disp: , Rfl:     omeprazole (PriLOSEC) 20 mg delayed release capsule, Take 20 mg by mouth daily, Disp: , Rfl:     Vitals: Blood pressure 120/70, pulse 76, temperature 98 3 °F (36 8 °C), temperature source Temporal, height 5' 6" (1 676 m), weight 59 9 kg (132 lb), SpO2 99 %  Body mass index is 21 31 kg/m²  Vitals:    01/15/21 1101   Weight: 59 9 kg (132 lb)     BP Readings from Last 3 Encounters:   01/15/21 120/70   07/09/20 120/60   01/13/20 130/64         Physical Exam  Constitutional:       General: He is not in acute distress  Appearance: He is well-developed  He is not diaphoretic  HENT:      Head: Normocephalic and atraumatic  Eyes:      Pupils: Pupils are equal, round, and reactive to light  Neck:      Musculoskeletal: Neck supple  Thyroid: No thyromegaly  Vascular: No JVD  Trachea: No tracheal deviation  Cardiovascular:      Rate and Rhythm: Normal rate and regular rhythm  Heart sounds: S1 normal and S2 normal  Heart sounds not distant  Murmur present  Systolic (ejection) murmur present with a grade of 2/6  No friction rub  No gallop  No S3 or S4 sounds  Comments: S1-S2 regular with 2 per murmur S4 present  Pulmonary:      Effort: Pulmonary effort is normal  No respiratory distress  Breath sounds: Normal breath sounds  No wheezing or rales  Chest:      Chest wall: No tenderness  Abdominal:      General: Bowel sounds are normal  There is no distension  Palpations: Abdomen is soft  Tenderness: There is no abdominal tenderness     Musculoskeletal:         General: No deformity  Skin:     General: Skin is warm and dry  Coloration: Skin is not pale  Findings: No rash  Neurological:      Mental Status: He is alert and oriented to person, place, and time  Psychiatric:         Behavior: Behavior normal          Judgment: Judgment normal            Diagnostic Studies Review Cardio:    Echo Doppler  Echo Doppler done 11/13/2019 shows EF 55 60%, mild LVH, grade 1 diastolic dysfunction, mild aortic regurgitation mild MR and PA pressure was normal     Holter monitor  Holter monitor shows no evidence of atrial fibrillation flutter  There were few episodes of PVCs which were asymptomatic  Stress test   Patient refused to do stress test     EKG:  Twelve lead EKG 10/08/2019 shows normal sinus rhythm  LVH by voltage  Heart rate 72 beats per minute  No other significant ST changes  Twelve lead EKG 07/09/2020 shows normal sinus rhythm  LVH by voltage heart rate 72 beats per minute no change from old EKG  Twelve lead EKG done 01/15/2021 shows sinus rhythm with few PACs heart rate 76 beats per minute no change from old EKG      Lab Review   Lab Results   Component Value Date    WBC 6 86 12/16/2019    HGB 13 6 12/16/2019    HCT 41 7 12/16/2019     (H) 12/16/2019    RDW 13 1 12/16/2019     12/16/2019     BMP:  Lab Results   Component Value Date    SODIUM 141 12/16/2019    K 3 9 12/16/2019     12/16/2019    CO2 28 12/16/2019    BUN 22 12/16/2019    CREATININE 1 16 12/16/2019    GLUC 118 01/05/2017    GLUF 113 (H) 12/16/2019    CALCIUM 9 2 12/16/2019    EGFR 55 12/16/2019    MG 1 9 01/05/2017     LFT:  Lab Results   Component Value Date    AST 18 12/16/2019    ALT 20 12/16/2019    ALKPHOS 60 12/16/2019    TP 7 0 12/16/2019    ALB 3 7 12/16/2019      Lab Results   Component Value Date    [id number] 3 940 (H) 12/16/2019       Lipid Profile:   Lab Results   Component Value Date    CHOLESTEROL 178 12/16/2019    HDL 63 12/16/2019    LDLCALC 98 12/16/2019    TRIG 84 12/16/2019     Lab Results   Component Value Date    CHOLESTEROL 178 12/16/2019    CHOLESTEROL 197 01/05/2017           Dr Maritza Kearney MD [facility] [place]      "This note has been constructed using a voice recognition system  Therefore there may be syntax, spelling, and/or grammatical errors   Please call if you have any questions  "

## 2021-01-15 ENCOUNTER — TELEPHONE (OUTPATIENT)
Dept: CARDIOLOGY CLINIC | Facility: CLINIC | Age: 86
End: 2021-01-15

## 2021-01-15 ENCOUNTER — OFFICE VISIT (OUTPATIENT)
Dept: CARDIOLOGY CLINIC | Facility: CLINIC | Age: 86
End: 2021-01-15
Payer: MEDICARE

## 2021-01-15 VITALS
TEMPERATURE: 98.3 F | BODY MASS INDEX: 21.21 KG/M2 | OXYGEN SATURATION: 99 % | HEART RATE: 76 BPM | WEIGHT: 132 LBS | HEIGHT: 66 IN | DIASTOLIC BLOOD PRESSURE: 70 MMHG | SYSTOLIC BLOOD PRESSURE: 120 MMHG

## 2021-01-15 DIAGNOSIS — R00.2 PALPITATIONS: ICD-10-CM

## 2021-01-15 DIAGNOSIS — R01.1 CARDIAC MURMUR: ICD-10-CM

## 2021-01-15 DIAGNOSIS — F41.9 ANXIETY: ICD-10-CM

## 2021-01-15 DIAGNOSIS — R06.00 DYSPNEA ON EXERTION: ICD-10-CM

## 2021-01-15 PROCEDURE — 93000 ELECTROCARDIOGRAM COMPLETE: CPT | Performed by: INTERNAL MEDICINE

## 2021-01-15 PROCEDURE — 99214 OFFICE O/P EST MOD 30 MIN: CPT | Performed by: INTERNAL MEDICINE

## 2022-07-13 ENCOUNTER — OFFICE VISIT (OUTPATIENT)
Dept: CARDIOLOGY CLINIC | Facility: CLINIC | Age: 87
End: 2022-07-13
Payer: MEDICARE

## 2022-07-13 VITALS
OXYGEN SATURATION: 98 % | SYSTOLIC BLOOD PRESSURE: 148 MMHG | TEMPERATURE: 98 F | DIASTOLIC BLOOD PRESSURE: 80 MMHG | HEART RATE: 79 BPM | BODY MASS INDEX: 21.69 KG/M2 | HEIGHT: 66 IN | WEIGHT: 135 LBS

## 2022-07-13 DIAGNOSIS — R00.2 PALPITATIONS: ICD-10-CM

## 2022-07-13 DIAGNOSIS — R06.09 DYSPNEA ON EXERTION: ICD-10-CM

## 2022-07-13 DIAGNOSIS — F41.9 ANXIETY: ICD-10-CM

## 2022-07-13 DIAGNOSIS — R01.1 CARDIAC MURMUR: ICD-10-CM

## 2022-07-13 PROCEDURE — 93000 ELECTROCARDIOGRAM COMPLETE: CPT | Performed by: INTERNAL MEDICINE

## 2022-07-13 PROCEDURE — 99214 OFFICE O/P EST MOD 30 MIN: CPT | Performed by: INTERNAL MEDICINE

## 2022-07-13 RX ORDER — ATENOLOL 25 MG/1
12.5 TABLET ORAL EVERY OTHER DAY
Qty: 15 TABLET | Refills: 1 | Status: SHIPPED | OUTPATIENT
Start: 2022-07-13 | End: 2022-09-26 | Stop reason: SDUPTHER

## 2022-07-13 NOTE — PROGRESS NOTES
Progress Note- Cardiology Office   [facility]  Esther Livingston 80 y o  male MRN: [id number]  : 1928  Unit/Bed#:  Encounter: [id number]      Assessment:     1  Dyspnea on exertion    2  Palpitations    3  Cardiac murmur    4  Anxiety        Discussion summary and Plan:  1  Palpitations and skipped beat  Patient has history of palpitations but they have become lot worse since he lost his wife  He is also very anxious about it  His previous Holter was in 2019  He feels there were no longer  Will start him on low-dose atenolol 12 5 every they other day  Will check Holter monitor and echo Doppler  He does not want any stress test     2  Dyspnea on exertion  He had distant exertion mostly scan to anxiety  Echo Doppler has been ordered  He refused to have stress test       3  Cardiac murmur  Patient has mild valvular disease with thickened aortic valve mild MR mild AI normal LV systolic function  Echo from 2019 reviewed  Will update echo done    4  Anxiety  Patient has been under lot of stress  His wife was with dementia she passed away he is anxious about it  His daughter is helping him take care of it    5  History of GERD  On on present all  6   Dyslipidemia  Borderline cholesterol but patient does not want take any statin therapy  Discussed with patient again    Patient's results discussed with him  Thank you for your consultation  If you have any question please call me at [phone]    Counseling :  A description of the counseling  Goals and Barriers  Patient's ability to self care: Yes  Medication side effect reviewed with patient in detail and all their questions answered to their satisfaction  Primary Care Physician : Robert Calderon MD    HPI :     Esther Livingston is a 80y o  year old male who was referred by primary care doctor for palpitations and skipped beat    Patient who has really no significant past medical history other than GERD has been under lot of stress as he had help take care of his wife who has multiple medical problem including dementia  He has to get up multiple time at night and has not been sleeping very well noted lately he is getting skipped beat and palpitations  He felt like he is missing heartbeats and occasionally feels faster  He denies any chest pain but he gets fatigue and tired and has some exertional shortness of breath  He has no real previous cardiac history in him  He has no recent or previous cardiac workup no history of MI no history of stents no history of any other significant cardiac problems  He get dyspnea and exertional decline monitor 2 flights of stair but no chest pain  He has used to smoke quit many many years ago  He he lives with his wife  He drinks about 6 oz of wine every day occasional coffee  No other significant dietary intakes  No stimulants  He denies any history of hypertension her blood pressure was slightly found to be elevated  He has no recent surgeries  01/15/2021  Above reviewed  Patient came for follow-up  Has occasional skipped beat and has occasional exertional shortness of breath  He does not want to exercise stress test or nuclear stress test   He has medical history significant for anxiety, mild chronic exertional shortness of breath which is chronic history of PACs and PVC and normal LV systolic function  He also does not want take any statin therapy  He has provides care to his wife  Today heart rate 76 beats per minute and few PACs noted no nausea no vomiting no fever no chills his blood test from 2019 reviewed with him  He is scheduled to have repeat blood test with his primary care doctor  07/13/2022  Above reviewed  Patient came for follow-up  He is under lot of stress he lost his wife  He has medical history significant for dyspnea on exertion, skipped beat, anxiety and previous history of PACs and PVCs    He has normal LV systolic function and he refused to do stress test   His last blood test in [facility] system was in 2019  Lost his wife in April 2022  He is on of feels anxious  Since then he feels palpitations  Danielle Jurist He feels heart rate racing  He has not been on any medications  He does check his blood pressure most readings are 110-120 with heart rate 60-80 beats per minute today his heart rate is 79 beats per minute  EKG shows sinus rhythm LVH by voltage no other significant change  No nausea no vomiting no fever no chills  He came with his daughter    Review of Systems   Constitutional: Negative for activity change, chills, diaphoresis, fever and unexpected weight change  HENT: Negative for congestion  Eyes: Negative for discharge and redness  Respiratory: Negative for cough, chest tightness, shortness of breath and wheezing  Cardiovascular: Positive for palpitations  Negative for chest pain and leg swelling  Gastrointestinal: Negative for abdominal pain, diarrhea and nausea  Endocrine: Negative  Genitourinary: Negative for decreased urine volume and urgency  Musculoskeletal: Positive for arthralgias  Negative for back pain and gait problem  Skin: Negative for rash and wound  Allergic/Immunologic: Negative  Neurological: Negative for dizziness, seizures, syncope, weakness, light-headedness and headaches  Hematological: Negative  Psychiatric/Behavioral: Negative for agitation and confusion  The patient is nervous/anxious  Historical Information   No past medical history on file  No past surgical history on file    Social History     Substance and Sexual Activity   Alcohol Use Yes    Alcohol/week: 1 0 standard drink    Types: 1 Glasses of wine per week    Comment: 1 glass of wine with lunch     Social History     Substance and Sexual Activity   Drug Use Not on file     Social History     Tobacco Use   Smoking Status Never Smoker   Smokeless Tobacco Never Used     Family History:   Family History   Problem Relation Age of Onset    No Known Problems Mother     No Known Problems Father        Meds/Allergies     No Known Allergies    Current Outpatient Medications:     aspirin (ECOTRIN LOW STRENGTH) 81 mg EC tablet, Take 81 mg by mouth daily, Disp: , Rfl:     atenolol (TENORMIN) 25 mg tablet, Take 0 5 tablets (12 5 mg total) by mouth every other day, Disp: 15 tablet, Rfl: 1    omeprazole (PriLOSEC) 20 mg delayed release capsule, Take 20 mg by mouth daily, Disp: , Rfl:     Vitals: Blood pressure 148/80, pulse 79, temperature 98 °F (36 7 °C), height 5' 6" (1 676 m), weight 61 2 kg (135 lb), SpO2 98 %  ?  Body mass index is 21 79 kg/m²  Vitals:    07/13/22 1543   Weight: 61 2 kg (135 lb)     BP Readings from Last 3 Encounters:   07/13/22 148/80   01/15/21 120/70   07/09/20 120/60         Physical Exam  Constitutional:       General: He is not in acute distress  Appearance: He is well-developed  He is not diaphoretic  Neck:      Thyroid: No thyromegaly  Vascular: No JVD  Trachea: No tracheal deviation  Cardiovascular:      Rate and Rhythm: Normal rate and regular rhythm  Heart sounds: S1 normal and S2 normal  Heart sounds not distant  Murmur heard  Systolic (ejection) murmur is present with a grade of 2/6  No friction rub  No gallop  No S3 or S4 sounds  Pulmonary:      Effort: Pulmonary effort is normal  No respiratory distress  Breath sounds: Normal breath sounds  No wheezing or rales  Chest:      Chest wall: No tenderness  Abdominal:      General: Bowel sounds are normal  There is no distension  Palpations: Abdomen is soft  Tenderness: There is no abdominal tenderness  Musculoskeletal:         General: No deformity  Cervical back: Neck supple  Skin:     General: Skin is warm and dry  Coloration: Skin is not pale  Findings: No rash     Neurological:      Mental Status: He is alert and oriented to person, place, and time    Psychiatric:         Behavior: Behavior normal          Judgment: Judgment normal            Diagnostic Studies Review Cardio:    Echo Doppler  Echo Doppler done 11/13/2019 shows EF 55 60%, mild LVH, grade 1 diastolic dysfunction, mild aortic regurgitation mild MR and PA pressure was normal     Holter monitor  Holter monitor shows no evidence of atrial fibrillation flutter  There were few episodes of PVCs which were asymptomatic  Stress test   Patient refused to do stress test     EKG:  Twelve lead EKG 10/08/2019 shows normal sinus rhythm  LVH by voltage  Heart rate 72 beats per minute  No other significant ST changes  Twelve lead EKG 07/09/2020 shows normal sinus rhythm  LVH by voltage heart rate 72 beats per minute no change from old EKG  Twelve lead EKG done 01/15/2021 shows sinus rhythm with few PACs heart rate 76 beats per minute no change from old EKG  Twelve lead EKG done on 07/13/2022 shows normal sinus rhythm heart rate 79 beats per minute      Lab Review   Lab Results   Component Value Date    WBC 6 86 12/16/2019    HGB 13 6 12/16/2019    HCT 41 7 12/16/2019     (H) 12/16/2019    RDW 13 1 12/16/2019     12/16/2019     BMP:  Lab Results   Component Value Date    SODIUM 141 12/16/2019    K 3 9 12/16/2019     12/16/2019    CO2 28 12/16/2019    BUN 22 12/16/2019    CREATININE 1 16 12/16/2019    GLUC 118 01/05/2017    GLUF 113 (H) 12/16/2019    CALCIUM 9 2 12/16/2019    EGFR 55 12/16/2019    MG 1 9 01/05/2017     LFT:  Lab Results   Component Value Date    AST 18 12/16/2019    ALT 20 12/16/2019    ALKPHOS 60 12/16/2019    TP 7 0 12/16/2019    ALB 3 7 12/16/2019      Lab Results   Component Value Date    RBE1FMFUPSKZ 3 940 (H) 12/16/2019       Lipid Profile:   Lab Results   Component Value Date    CHOLESTEROL 178 12/16/2019    HDL 63 12/16/2019    LDLCALC 98 12/16/2019    TRIG 84 12/16/2019     Lab Results   Component Value Date    CHOLESTEROL 178 12/16/2019 CHOLESTEROL 197 01/05/2017           Dr Rashid Chen MD [facility] [place]      "This note has been constructed using a voice recognition system  Therefore there may be syntax, spelling, and/or grammatical errors   Please call if you have any questions  "

## 2022-07-15 ENCOUNTER — APPOINTMENT (OUTPATIENT)
Dept: LAB | Facility: CLINIC | Age: 87
End: 2022-07-15
Payer: MEDICARE

## 2022-07-15 DIAGNOSIS — R06.09 DYSPNEA ON EXERTION: ICD-10-CM

## 2022-07-15 DIAGNOSIS — R00.2 PALPITATIONS: ICD-10-CM

## 2022-07-15 DIAGNOSIS — F41.9 ANXIETY: ICD-10-CM

## 2022-07-15 DIAGNOSIS — R01.1 CARDIAC MURMUR: ICD-10-CM

## 2022-07-15 LAB
ALBUMIN SERPL BCP-MCNC: 3.8 G/DL (ref 3.5–5)
ALP SERPL-CCNC: 52 U/L (ref 46–116)
ALT SERPL W P-5'-P-CCNC: 23 U/L (ref 12–78)
ANION GAP SERPL CALCULATED.3IONS-SCNC: 3 MMOL/L (ref 4–13)
AST SERPL W P-5'-P-CCNC: 21 U/L (ref 5–45)
BASOPHILS # BLD AUTO: 0.02 THOUSANDS/ΜL (ref 0–0.1)
BASOPHILS NFR BLD AUTO: 0 % (ref 0–1)
BILIRUB SERPL-MCNC: 1.24 MG/DL (ref 0.2–1)
BUN SERPL-MCNC: 23 MG/DL (ref 5–25)
CALCIUM SERPL-MCNC: 9.5 MG/DL (ref 8.3–10.1)
CHLORIDE SERPL-SCNC: 107 MMOL/L (ref 100–108)
CO2 SERPL-SCNC: 28 MMOL/L (ref 21–32)
CREAT SERPL-MCNC: 1.14 MG/DL (ref 0.6–1.3)
EOSINOPHIL # BLD AUTO: 0.09 THOUSAND/ΜL (ref 0–0.61)
EOSINOPHIL NFR BLD AUTO: 1 % (ref 0–6)
ERYTHROCYTE [DISTWIDTH] IN BLOOD BY AUTOMATED COUNT: 12.9 % (ref 11.6–15.1)
GFR SERPL CREATININE-BSD FRML MDRD: 55 ML/MIN/1.73SQ M
GLUCOSE P FAST SERPL-MCNC: 89 MG/DL (ref 65–99)
HCT VFR BLD AUTO: 39.3 % (ref 36.5–49.3)
HGB BLD-MCNC: 12.5 G/DL (ref 12–17)
IMM GRANULOCYTES # BLD AUTO: 0.01 THOUSAND/UL (ref 0–0.2)
IMM GRANULOCYTES NFR BLD AUTO: 0 % (ref 0–2)
LYMPHOCYTES # BLD AUTO: 2.78 THOUSANDS/ΜL (ref 0.6–4.47)
LYMPHOCYTES NFR BLD AUTO: 41 % (ref 14–44)
MCH RBC QN AUTO: 31.5 PG (ref 26.8–34.3)
MCHC RBC AUTO-ENTMCNC: 31.8 G/DL (ref 31.4–37.4)
MCV RBC AUTO: 99 FL (ref 82–98)
MONOCYTES # BLD AUTO: 0.6 THOUSAND/ΜL (ref 0.17–1.22)
MONOCYTES NFR BLD AUTO: 9 % (ref 4–12)
NEUTROPHILS # BLD AUTO: 3.3 THOUSANDS/ΜL (ref 1.85–7.62)
NEUTS SEG NFR BLD AUTO: 49 % (ref 43–75)
NRBC BLD AUTO-RTO: 0 /100 WBCS
PLATELET # BLD AUTO: 205 THOUSANDS/UL (ref 149–390)
PMV BLD AUTO: 9.4 FL (ref 8.9–12.7)
POTASSIUM SERPL-SCNC: 3.9 MMOL/L (ref 3.5–5.3)
PROT SERPL-MCNC: 6.9 G/DL (ref 6.4–8.2)
RBC # BLD AUTO: 3.97 MILLION/UL (ref 3.88–5.62)
SODIUM SERPL-SCNC: 138 MMOL/L (ref 136–145)
TSH SERPL DL<=0.05 MIU/L-ACNC: 2.3 UIU/ML (ref 0.45–4.5)
WBC # BLD AUTO: 6.8 THOUSAND/UL (ref 4.31–10.16)

## 2022-07-15 PROCEDURE — 80053 COMPREHEN METABOLIC PANEL: CPT

## 2022-07-15 PROCEDURE — 85025 COMPLETE CBC W/AUTO DIFF WBC: CPT

## 2022-07-15 PROCEDURE — 84443 ASSAY THYROID STIM HORMONE: CPT

## 2022-07-15 PROCEDURE — 36415 COLL VENOUS BLD VENIPUNCTURE: CPT

## 2022-07-27 ENCOUNTER — HOSPITAL ENCOUNTER (OUTPATIENT)
Dept: NON INVASIVE DIAGNOSTICS | Facility: HOSPITAL | Age: 87
Discharge: HOME | End: 2022-07-27
Attending: INTERNAL MEDICINE
Payer: MEDICARE

## 2022-07-27 ENCOUNTER — TELEPHONE (OUTPATIENT)
Dept: CARDIOLOGY CLINIC | Facility: CLINIC | Age: 87
End: 2022-07-27

## 2022-07-27 VITALS
WEIGHT: 135 LBS | DIASTOLIC BLOOD PRESSURE: 72 MMHG | HEIGHT: 66 IN | SYSTOLIC BLOOD PRESSURE: 148 MMHG | HEART RATE: 67 BPM | BODY MASS INDEX: 21.69 KG/M2

## 2022-07-27 DIAGNOSIS — R00.2 PALPITATIONS: ICD-10-CM

## 2022-07-27 DIAGNOSIS — R06.09 DYSPNEA ON EXERTION: ICD-10-CM

## 2022-07-27 DIAGNOSIS — R01.1 CARDIAC MURMUR: ICD-10-CM

## 2022-07-27 DIAGNOSIS — F41.9 ANXIETY: ICD-10-CM

## 2022-07-27 LAB
AORTIC ROOT: 3.7 CM
APICAL FOUR CHAMBER EJECTION FRACTION: 63 %
AV AREA PEAK VELOCITY: 2.2 CM2
AV LVOT MEAN GRADIENT: 2 MMHG
AV LVOT PEAK GRADIENT: 5 MMHG
AV PEAK GRADIENT: 10 MMHG
DOP CALC LVOT AREA: 3.14 CM2
DOP CALC LVOT DIAMETER: 2 CM
DOP CALC LVOT PEAK VEL VTI: 20.48 CM
DOP CALC LVOT PEAK VEL: 1.07 M/S
DOP CALC LVOT STROKE INDEX: 37.3 ML/M2
DOP CALC LVOT STROKE VOLUME: 64.31 CM3
E WAVE DECELERATION TIME: 205 MS
FRACTIONAL SHORTENING: 31 % (ref 28–44)
INTERVENTRICULAR SEPTUM IN DIASTOLE (PARASTERNAL SHORT AXIS VIEW): 0.8 CM
INTERVENTRICULAR SEPTUM: 0.8 CM (ref 0.6–1.1)
LAAS-AP2: 23.5 CM2
LAAS-AP4: 19 CM2
LEFT ATRIUM AREA SYSTOLE SINGLE PLANE A4C: 17.4 CM2
LEFT ATRIUM SIZE: 4.2 CM
LEFT INTERNAL DIMENSION IN SYSTOLE: 2.5 CM (ref 2.1–4)
LEFT VENTRICULAR INTERNAL DIMENSION IN DIASTOLE: 3.6 CM (ref 3.5–6)
LEFT VENTRICULAR POSTERIOR WALL IN END DIASTOLE: 0.8 CM
LEFT VENTRICULAR STROKE VOLUME: 30 ML
LVSV (TEICH): 30 ML
MV E'TISSUE VEL-LAT: 8 CM/S
MV E'TISSUE VEL-SEP: 6 CM/S
MV PEAK A VEL: 0.85 M/S
MV PEAK E VEL: 73 CM/S
MV STENOSIS PRESSURE HALF TIME: 59 MS
MV VALVE AREA P 1/2 METHOD: 3.73 CM2
PV PEAK GRADIENT: 7 MMHG
RIGHT ATRIUM AREA SYSTOLE A4C: 14.9 CM2
RIGHT VENTRICLE ID DIMENSION: 2.7 CM
SL CV LEFT ATRIUM LENGTH A2C: 5.9 CM
SL CV PED ECHO LEFT VENTRICLE DIASTOLIC VOLUME (MOD BIPLANE) 2D: 53 ML
SL CV PED ECHO LEFT VENTRICLE SYSTOLIC VOLUME (MOD BIPLANE) 2D: 23 ML
TR MAX PG: 30 MMHG
TR PEAK VELOCITY: 2.8 M/S
TRICUSPID VALVE PEAK REGURGITATION VELOCITY: 2.76 M/S

## 2022-07-27 PROCEDURE — 93226 XTRNL ECG REC<48 HR SCAN A/R: CPT

## 2022-07-27 PROCEDURE — 93227 XTRNL ECG REC<48 HR R&I: CPT | Performed by: INTERNAL MEDICINE

## 2022-07-27 PROCEDURE — 93306 TTE W/DOPPLER COMPLETE: CPT

## 2022-07-27 PROCEDURE — 93225 XTRNL ECG REC<48 HRS REC: CPT

## 2022-07-27 PROCEDURE — 93306 TTE W/DOPPLER COMPLETE: CPT | Performed by: INTERNAL MEDICINE

## 2022-07-27 NOTE — TELEPHONE ENCOUNTER
----- Message from Ana Anderson MD sent at 7/27/2022 12:41 PM EDT -----  Patient's echo shows normal LV systolic function  No significant, to mild valvular disease  Patient can keep an appointment  Please call patient about echo report

## 2022-08-05 ENCOUNTER — TELEPHONE (OUTPATIENT)
Dept: CARDIOLOGY CLINIC | Facility: CLINIC | Age: 87
End: 2022-08-05

## 2022-08-05 NOTE — TELEPHONE ENCOUNTER
sw daughter, she believes that he will not be doing further testing  She reports that he has had no more episodes since the visit with the you

## 2022-08-05 NOTE — TELEPHONE ENCOUNTER
----- Message from Tai Aleman MD sent at 8/5/2022  8:27 AM EDT -----  Patient's Holter monitor shows he had episode of NSVT  He did mention any symptoms in his diary  That could explain his palpitations  He is already on beta-blocker  Ideally we would like to do a stress test or cardiac catheterization if patient wants to know if there is any blockages    If patient or his family agrees at least we would like to do stress test

## 2022-09-26 ENCOUNTER — OFFICE VISIT (OUTPATIENT)
Dept: CARDIOLOGY CLINIC | Facility: CLINIC | Age: 87
End: 2022-09-26
Payer: MEDICARE

## 2022-09-26 VITALS
OXYGEN SATURATION: 100 % | TEMPERATURE: 97 F | SYSTOLIC BLOOD PRESSURE: 140 MMHG | HEIGHT: 66 IN | WEIGHT: 133 LBS | DIASTOLIC BLOOD PRESSURE: 60 MMHG | BODY MASS INDEX: 21.38 KG/M2 | HEART RATE: 65 BPM

## 2022-09-26 DIAGNOSIS — R06.09 DYSPNEA ON EXERTION: ICD-10-CM

## 2022-09-26 DIAGNOSIS — R00.2 PALPITATIONS: ICD-10-CM

## 2022-09-26 DIAGNOSIS — F41.9 ANXIETY: ICD-10-CM

## 2022-09-26 DIAGNOSIS — I47.29 NSVT (NONSUSTAINED VENTRICULAR TACHYCARDIA): ICD-10-CM

## 2022-09-26 DIAGNOSIS — R01.1 CARDIAC MURMUR: ICD-10-CM

## 2022-09-26 PROCEDURE — 99214 OFFICE O/P EST MOD 30 MIN: CPT | Performed by: INTERNAL MEDICINE

## 2022-09-26 RX ORDER — ATENOLOL 25 MG/1
12.5 TABLET ORAL EVERY OTHER DAY
Qty: 45 TABLET | Refills: 1 | Status: SHIPPED | OUTPATIENT
Start: 2022-09-26

## 2022-09-26 NOTE — PROGRESS NOTES
Progress Note- Cardiology Office   [facility]  Tobisa Beach 80 y o  male MRN: [id number]  : 1928  Unit/Bed#:  Encounter: [id number]      Assessment:     1  Dyspnea on exertion    2  Palpitations    3  Cardiac murmur    4  Anxiety    5  NSVT (nonsustained ventricular tachycardia)        Discussion summary and Plan:  1  Palpitation and skipped beat  Patient is anxious about it  He says he feels better since he is taking atenolol 12 5 mg every other day  Findings of Holter echo reviewed with him  He does not want any stress test    2  Dyspnea on exertion  Chronic not changed  3  Cardiac murmur  Patient has mild valvular disease with thickened aortic valve mild MR mild AI normal LV systolic function  Echo from  reviewed  No significant change mild valvular disease EF is normal     4  Anxiety  Patient has been under lot of stress  His wife was with dementia she passed away he is anxious about it  His daughter is helping him take care of it    5  History of GERD  On on present all  6   Dyslipidemia  Borderline cholesterol but patient does not want take any statin therapy  Discussed with patient again      7  Episode of NSVT  Patient asymptomatic episode of NSVT  His EF is normal he does not want ischemia workup  He is okay with low-dose beta-blocker  Patient's results discussed with him  Thank you for your consultation  If you have any question please call me at [phone]    Counseling :  A description of the counseling  Goals and Barriers  Patient's ability to self care: Yes  Medication side effect reviewed with patient in detail and all their questions answered to their satisfaction  Primary Care Physician : Niki Alexander MD    HPI :     Tobias Beach is a 80y o  year old male who was referred by primary care doctor for palpitations and skipped beat    Patient who has really no significant past medical history other than GERD has been under lot of stress as he had help take care of his wife who has multiple medical problem including dementia  He has to get up multiple time at night and has not been sleeping very well noted lately he is getting skipped beat and palpitations  He felt like he is missing heartbeats and occasionally feels faster  He denies any chest pain but he gets fatigue and tired and has some exertional shortness of breath  He has no real previous cardiac history in him  He has no recent or previous cardiac workup no history of MI no history of stents no history of any other significant cardiac problems  He get dyspnea and exertional decline monitor 2 flights of stair but no chest pain  He has used to smoke quit many many years ago  He he lives with his wife  He drinks about 6 oz of wine every day occasional coffee  No other significant dietary intakes  No stimulants  He denies any history of hypertension her blood pressure was slightly found to be elevated  He has no recent surgeries  07/13/2022  Above reviewed  Patient came for follow-up  He is under lot of stress he lost his wife  He has medical history significant for dyspnea on exertion, skipped beat, anxiety and previous history of PACs and PVCs  He has normal LV systolic function and he refused to do stress test   His last blood test in [facility] system was in 2019  Lost his wife in April 2022  He is on of feels anxious  Since then he feels palpitations  Fouzia Alcantara He feels heart rate racing  He has not been on any medications  He does check his blood pressure most readings are 110-120 with heart rate 60-80 beats per minute today his heart rate is 79 beats per minute  EKG shows sinus rhythm LVH by voltage no other significant change  No nausea no vomiting no fever no chills  He came with his daughter    09/26/2022  Above reviewed  Patient came for follow-up  He still under lot of stress as he lost his wife    He has medical history significant distant exertion, sick skipped beat, PACs, PVCs and short wide complex on it probably NSVT  He refused to do stress test   His blood test in [facility] in July 2022 was acceptable  He does check his blood pressure and most readings are acceptable  He has no nausea no vomiting no fever no chills no other cardiovascular issues  He came with his daughter  He is 100% sure he does not want stress test   His Holter from 07/27/2020 reviewed  He had an episode of NSVT which was asymptomatic  His echo Doppler shows his EF is normal and mild valvular disease  Review of Systems   Constitutional: Negative for activity change, chills, diaphoresis, fever and unexpected weight change  HENT: Negative for congestion  Eyes: Negative for discharge and redness  Respiratory: Negative for cough, chest tightness, shortness of breath and wheezing  Cardiovascular: Positive for palpitations  Negative for chest pain and leg swelling  Rarely   Gastrointestinal: Negative for abdominal pain, diarrhea and nausea  Endocrine: Negative  Genitourinary: Negative for decreased urine volume and urgency  Musculoskeletal: Positive for arthralgias  Negative for back pain and gait problem  Skin: Negative for rash and wound  Allergic/Immunologic: Negative  Neurological: Negative for dizziness, seizures, syncope, weakness, light-headedness and headaches  Hematological: Negative  Psychiatric/Behavioral: Negative for agitation and confusion  The patient is nervous/anxious  Historical Information   No past medical history on file  No past surgical history on file    Social History     Substance and Sexual Activity   Alcohol Use Yes    Alcohol/week: 1 0 standard drink    Types: 1 Glasses of wine per week    Comment: 1 glass of wine with lunch     Social History     Substance and Sexual Activity   Drug Use Not on file     Social History     Tobacco Use   Smoking Status Never Smoker Smokeless Tobacco Never Used     Family History:   Family History   Problem Relation Age of Onset    No Known Problems Mother     No Known Problems Father        Meds/Allergies     No Known Allergies    Current Outpatient Medications:     aspirin (ECOTRIN LOW STRENGTH) 81 mg EC tablet, Take 81 mg by mouth daily, Disp: , Rfl:     atenolol (TENORMIN) 25 mg tablet, Take 0 5 tablets (12 5 mg total) by mouth every other day, Disp: 45 tablet, Rfl: 1    Melatonin 5 MG CAPS, Take 5 mg by mouth, Disp: , Rfl:     omeprazole (PriLOSEC) 20 mg delayed release capsule, Take 20 mg by mouth daily (Patient not taking: Reported on 9/26/2022), Disp: , Rfl:     Vitals: Blood pressure 140/60, pulse 65, temperature (!) 97 °F (36 1 °C), height 5' 6" (1 676 m), weight 60 3 kg (133 lb), SpO2 100 %  ?  Body mass index is 21 47 kg/m²  Vitals:    09/26/22 1421   Weight: 60 3 kg (133 lb)     BP Readings from Last 3 Encounters:   09/26/22 140/60   07/27/22 148/72   07/13/22 148/80         Physical Exam  Constitutional:       General: He is not in acute distress  Appearance: He is well-developed  He is not diaphoretic  Neck:      Thyroid: No thyromegaly  Vascular: No JVD  Trachea: No tracheal deviation  Cardiovascular:      Rate and Rhythm: Normal rate and regular rhythm  Heart sounds: S1 normal and S2 normal  Heart sounds not distant  Murmur heard  Systolic (ejection) murmur is present with a grade of 2/6  No friction rub  No gallop  No S3 or S4 sounds  Pulmonary:      Effort: Pulmonary effort is normal  No respiratory distress  Breath sounds: Normal breath sounds  No wheezing or rales  Chest:      Chest wall: No tenderness  Abdominal:      General: Bowel sounds are normal  There is no distension  Palpations: Abdomen is soft  Tenderness: There is no abdominal tenderness  Musculoskeletal:         General: No deformity  Cervical back: Neck supple     Skin:     General: Skin is warm and dry  Coloration: Skin is not pale  Findings: No rash  Neurological:      Mental Status: He is alert and oriented to person, place, and time  Psychiatric:         Behavior: Behavior normal          Judgment: Judgment normal            Diagnostic Studies Review Cardio:    Echo Doppler  Echo Doppler done 11/13/2019 shows EF 55 60%, mild LVH, grade 1 diastolic dysfunction, mild aortic regurgitation mild MR and PA pressure was normal     Holter monitor  Holter monitor shows no evidence of atrial fibrillation flutter  There were few episodes of PVCs which were asymptomatic  Episode of NSVT noted  Study done on July 2022    Stress test   Patient refused to do stress test     EKG:  Twelve lead EKG 10/08/2019 shows normal sinus rhythm  LVH by voltage  Heart rate 72 beats per minute  No other significant ST changes  Twelve lead EKG 07/09/2020 shows normal sinus rhythm  LVH by voltage heart rate 72 beats per minute no change from old EKG  Twelve lead EKG done 01/15/2021 shows sinus rhythm with few PACs heart rate 76 beats per minute no change from old EKG  Twelve lead EKG done on 07/13/2022 shows normal sinus rhythm heart rate 79 beats per minute      Lab Review   Lab Results   Component Value Date    WBC 6 80 07/15/2022    HGB 12 5 07/15/2022    HCT 39 3 07/15/2022    MCV 99 (H) 07/15/2022    RDW 12 9 07/15/2022     07/15/2022     BMP:  Lab Results   Component Value Date    SODIUM 138 07/15/2022    K 3 9 07/15/2022     07/15/2022    CO2 28 07/15/2022    BUN 23 07/15/2022    CREATININE 1 14 07/15/2022    GLUC 118 01/05/2017    GLUF 89 07/15/2022    CALCIUM 9 5 07/15/2022    EGFR 55 07/15/2022    MG 1 9 01/05/2017     LFT:  Lab Results   Component Value Date    AST 21 07/15/2022    ALT 23 07/15/2022    ALKPHOS 52 07/15/2022    TP 6 9 07/15/2022    ALB 3 8 07/15/2022      Lab Results   Component Value Date    [id number] 2 300 07/15/2022       Lipid Profile:   Lab Results Component Value Date    CHOLESTEROL 178 12/16/2019    HDL 63 12/16/2019    LDLCALC 98 12/16/2019    TRIG 84 12/16/2019     Lab Results   Component Value Date    CHOLESTEROL 178 12/16/2019    CHOLESTEROL 197 01/05/2017           Dr Antonio Little MD [facility] [place]      "This note has been constructed using a voice recognition system  Therefore there may be syntax, spelling, and/or grammatical errors   Please call if you have any questions  "

## 2023-03-16 ENCOUNTER — OFFICE VISIT (OUTPATIENT)
Dept: CARDIOLOGY CLINIC | Facility: CLINIC | Age: 88
End: 2023-03-16

## 2023-03-16 VITALS
SYSTOLIC BLOOD PRESSURE: 130 MMHG | DIASTOLIC BLOOD PRESSURE: 60 MMHG | OXYGEN SATURATION: 100 % | HEIGHT: 66 IN | HEART RATE: 69 BPM | BODY MASS INDEX: 21.69 KG/M2 | WEIGHT: 135 LBS

## 2023-03-16 DIAGNOSIS — F41.9 ANXIETY: ICD-10-CM

## 2023-03-16 DIAGNOSIS — I47.29 NSVT (NONSUSTAINED VENTRICULAR TACHYCARDIA): ICD-10-CM

## 2023-03-16 DIAGNOSIS — R06.09 DYSPNEA ON EXERTION: ICD-10-CM

## 2023-03-16 DIAGNOSIS — R01.1 CARDIAC MURMUR: ICD-10-CM

## 2023-03-16 DIAGNOSIS — R00.2 PALPITATIONS: ICD-10-CM

## 2023-03-16 RX ORDER — ATENOLOL 25 MG/1
12.5 TABLET ORAL EVERY OTHER DAY
Qty: 45 TABLET | Refills: 3 | Status: SHIPPED | OUTPATIENT
Start: 2023-03-16

## 2023-03-16 NOTE — PROGRESS NOTES
Progress Note- Cardiology Office  [facility]  Patrick Mcpherson 80 y o  male MRN: [id number]  : 1928  Unit/Bed#:  Encounter: [id number]      Assessment:     1  Dyspnea on exertion    2  Palpitations    3  Cardiac murmur    4  NSVT (nonsustained ventricular tachycardia)    5  Anxiety        Discussion summary and Plan:  1  Palpitation and skipped beat  Patient is anxious about it  He says he feels better since he is taking atenolol 12 5 mg every other day  Findings of Holter echo reviewed with him  He does not want any stress test   We will continue atenolol he likes it  2  Dyspnea on exertion  Chronic not changed  Does not want any invasive or stress test   Or any cardiac work-up      3  Cardiac murmur  Patient has mild valvular disease with thickened aortic valve mild MR mild AI normal LV systolic function  Echo from  reviewed  No significant change mild valvular disease EF is normal   No clinical change in his murmur    4  Anxiety  Patient has been under lot of stress  His wife was with dementia she passed away he is anxious about it  His daughter is helping him take care of it    5  History of GERD  On on present all  6   Dyslipidemia  Borderline cholesterol but patient does not want take any statin therapy  Discussed with patient again      7  Episode of NSVT  Patient asymptomatic episode of NSVT  His EF is normal he does not want ischemia workup  He is okay with low-dose beta-blocker  Continue atenolol  Patient's results discussed with him  Thank you for your consultation  If you have any question please call me at [phone]    Counseling :  A description of the counseling  Goals and Barriers  Patient's ability to self care: Yes  Medication side effect reviewed with patient in detail and all their questions answered to their satisfaction        Primary Care Physician : Helga Alegria MD    HPI :     Patrick cMpherson is a 80y o  year old male who was referred by primary care doctor for palpitations and skipped beat  Patient who has really no significant past medical history other than GERD has been under lot of stress as he had help take care of his wife who has multiple medical problem including dementia  He has to get up multiple time at night and has not been sleeping very well noted lately he is getting skipped beat and palpitations  He felt like he is missing heartbeats and occasionally feels faster  He denies any chest pain but he gets fatigue and tired and has some exertional shortness of breath  He has no real previous cardiac history in him  He has no recent or previous cardiac workup no history of MI no history of stents no history of any other significant cardiac problems  He get dyspnea and exertional decline monitor 2 flights of stair but no chest pain  He has used to smoke quit many many years ago  He he lives with his wife  He drinks about 6 oz of wine every day occasional coffee  No other significant dietary intakes  No stimulants  He denies any history of hypertension her blood pressure was slightly found to be elevated  He has no recent surgeries  07/13/2022  Above reviewed  Patient came for follow-up  He is under lot of stress he lost his wife  He has medical history significant for dyspnea on exertion, skipped beat, anxiety and previous history of PACs and PVCs  He has normal LV systolic function and he refused to do stress test   His last blood test in [facility] was in 2019  Lost his wife in April 2022  He is on of feels anxious  Since then he feels palpitations  Arther Soja He feels heart rate racing  He has not been on any medications  He does check his blood pressure most readings are 110-120 with heart rate 60-80 beats per minute today his heart rate is 79 beats per minute  EKG shows sinus rhythm LVH by voltage no other significant change    No nausea no vomiting no fever no chills  He came with his daughter    09/26/2022  Above reviewed  Patient came for follow-up  He still under lot of stress as he lost his wife  He has medical history significant distant exertion, sick skipped beat, PACs, PVCs and short wide complex on it probably NSVT  He refused to do stress test   His blood test in [facility] in July 2022 was acceptable  He does check his blood pressure and most readings are acceptable  He has no nausea no vomiting no fever no chills no other cardiovascular issues  He came with his daughter  He is 100% sure he does not want stress test   His Holter from 07/27/2020 reviewed  He had an episode of NSVT which was asymptomatic  His echo Doppler shows his EF is normal and mild valvular disease  3/16/2023  Reviewed  Patient came for follow-up he is doing well  He seems to like atenolol that has helped him with palpitation and skipped beat  He had an episode of NSVT on the Holter monitor  He does not want to do stress test   Blood test in July 2022 was acceptable thyroid function was stable  EKG shows sinus, heart rate 69 bpm he is on atenolol 12 5 mg every other day  He does not take any other medications  Echo shows normal systolic function with mild valvular disease  He came with his daughter  No other cardiovascular issues  Review of Systems   Constitutional: Negative for activity change, chills, diaphoresis, fever and unexpected weight change  HENT: Negative for congestion  Eyes: Negative for discharge and redness  Respiratory: Negative for cough, chest tightness, shortness of breath and wheezing  Cardiovascular: Positive for palpitations  Negative for chest pain and leg swelling  Much better now  Gastrointestinal: Negative for abdominal pain, diarrhea and nausea  Endocrine: Negative  Genitourinary: Negative for decreased urine volume and urgency  Musculoskeletal: Positive for arthralgias   Negative for back pain and gait problem  Skin: Negative for rash and wound  Allergic/Immunologic: Negative  Neurological: Negative for dizziness, seizures, syncope, weakness, light-headedness and headaches  Hematological: Negative  Psychiatric/Behavioral: Negative for agitation and confusion  The patient is nervous/anxious  Historical Information   History reviewed  No pertinent past medical history  History reviewed  No pertinent surgical history  Social History     Substance and Sexual Activity   Alcohol Use Yes   • Alcohol/week: 1 0 standard drink   • Types: 1 Glasses of wine per week    Comment: 1 glass of wine with lunch     Social History     Substance and Sexual Activity   Drug Use Not on file     Social History     Tobacco Use   Smoking Status Never   Smokeless Tobacco Never     Family History:   Family History   Problem Relation Age of Onset   • No Known Problems Mother    • No Known Problems Father        Meds/Allergies     No Known Allergies    Current Outpatient Medications:   •  aspirin (ECOTRIN LOW STRENGTH) 81 mg EC tablet, Take 81 mg by mouth daily, Disp: , Rfl:   •  atenolol (TENORMIN) 25 mg tablet, Take 0 5 tablets (12 5 mg total) by mouth every other day, Disp: 45 tablet, Rfl: 3  •  Melatonin 5 MG CAPS, Take 5 mg by mouth (Patient not taking: Reported on 3/16/2023), Disp: , Rfl:   •  omeprazole (PriLOSEC) 20 mg delayed release capsule, Take 20 mg by mouth daily (Patient not taking: Reported on 9/26/2022), Disp: , Rfl:     Vitals: Blood pressure 130/60, pulse 69, height 5' 6" (1 676 m), weight 61 2 kg (135 lb), SpO2 100 %  ?  Body mass index is 21 79 kg/m²  Vitals:    03/16/23 1315   Weight: 61 2 kg (135 lb)     BP Readings from Last 3 Encounters:   03/16/23 130/60   09/26/22 140/60   07/27/22 148/72         Physical Exam  Constitutional:       General: He is not in acute distress  Appearance: He is well-developed  He is not diaphoretic  Neck:      Thyroid: No thyromegaly  Vascular: No JVD  Trachea: No tracheal deviation  Cardiovascular:      Rate and Rhythm: Normal rate and regular rhythm  Heart sounds: S1 normal and S2 normal  Heart sounds not distant  Murmur heard  Systolic (ejection) murmur is present with a grade of 2/6  No friction rub  No gallop  No S3 or S4 sounds  Pulmonary:      Effort: Pulmonary effort is normal  No respiratory distress  Breath sounds: Normal breath sounds  No wheezing or rales  Chest:      Chest wall: No tenderness  Abdominal:      General: Bowel sounds are normal  There is no distension  Palpations: Abdomen is soft  Tenderness: There is no abdominal tenderness  Musculoskeletal:         General: No deformity  Cervical back: Neck supple  Skin:     General: Skin is warm and dry  Coloration: Skin is not pale  Findings: No rash  Neurological:      Mental Status: He is alert and oriented to person, place, and time  Psychiatric:         Behavior: Behavior normal          Judgment: Judgment normal            Diagnostic Studies Review Cardio:    Echo Doppler  Echo Doppler done 11/13/2019 shows EF 55 60%, mild LVH, grade 1 diastolic dysfunction, mild aortic regurgitation mild MR and PA pressure was normal     Holter monitor  Holter monitor shows no evidence of atrial fibrillation flutter  There were few episodes of PVCs which were asymptomatic  Episode of NSVT noted  Study done on July 2022    Stress test   Patient refused to do stress test     EKG:  Twelve lead EKG 10/08/2019 shows normal sinus rhythm  LVH by voltage  Heart rate 72 beats per minute  No other significant ST changes  Twelve lead EKG 07/09/2020 shows normal sinus rhythm  LVH by voltage heart rate 72 beats per minute no change from old EKG  Twelve lead EKG done 01/15/2021 shows sinus rhythm with few PACs heart rate 76 beats per minute no change from old EKG      Twelve lead EKG done on 07/13/2022 shows normal sinus rhythm heart rate 79 beats per minute  Twelve-lead EKG 3/16/2023 shows normal sinus some heart rate 69 bpm LVH by voltage no change from previous EKG  Lab Review   Lab Results   Component Value Date    WBC 6 80 07/15/2022    HGB 12 5 07/15/2022    HCT 39 3 07/15/2022    MCV 99 (H) 07/15/2022    RDW 12 9 07/15/2022     07/15/2022     BMP:  Lab Results   Component Value Date    SODIUM 138 07/15/2022    K 3 9 07/15/2022     07/15/2022    CO2 28 07/15/2022    BUN 23 07/15/2022    CREATININE 1 14 07/15/2022    GLUC 118 01/05/2017    GLUF 89 07/15/2022    CALCIUM 9 5 07/15/2022    EGFR 55 07/15/2022    MG 1 9 01/05/2017     LFT:  Lab Results   Component Value Date    AST 21 07/15/2022    ALT 23 07/15/2022    ALKPHOS 52 07/15/2022    TP 6 9 07/15/2022    ALB 3 8 07/15/2022      Lab Results   Component Value Date    YLL8BSSHBXSF 2 300 07/15/2022       Lipid Profile:   Lab Results   Component Value Date    CHOLESTEROL 178 12/16/2019    HDL 63 12/16/2019    LDLCALC 98 12/16/2019    TRIG 84 12/16/2019     Lab Results   Component Value Date    CHOLESTEROL 178 12/16/2019    CHOLESTEROL 197 01/05/2017           Dr Evelia Calero MD [facility] [place]      "This note has been constructed using a voice recognition system  Therefore there may be syntax, spelling, and/or grammatical errors   Please call if you have any questions  "

## 2023-10-05 ENCOUNTER — OFFICE VISIT (OUTPATIENT)
Dept: CARDIOLOGY CLINIC | Facility: CLINIC | Age: 88
End: 2023-10-05
Payer: MEDICARE

## 2023-10-05 ENCOUNTER — APPOINTMENT (OUTPATIENT)
Dept: LAB | Facility: CLINIC | Age: 88
End: 2023-10-05
Payer: MEDICARE

## 2023-10-05 VITALS
HEART RATE: 61 BPM | SYSTOLIC BLOOD PRESSURE: 128 MMHG | HEIGHT: 66 IN | BODY MASS INDEX: 21.69 KG/M2 | WEIGHT: 135 LBS | DIASTOLIC BLOOD PRESSURE: 68 MMHG | OXYGEN SATURATION: 98 %

## 2023-10-05 DIAGNOSIS — I47.29 NSVT (NONSUSTAINED VENTRICULAR TACHYCARDIA) (HCC): ICD-10-CM

## 2023-10-05 DIAGNOSIS — F41.9 ANXIETY: ICD-10-CM

## 2023-10-05 DIAGNOSIS — R01.1 CARDIAC MURMUR: ICD-10-CM

## 2023-10-05 DIAGNOSIS — R00.2 PALPITATIONS: ICD-10-CM

## 2023-10-05 DIAGNOSIS — R06.09 DYSPNEA ON EXERTION: ICD-10-CM

## 2023-10-05 LAB
ALBUMIN SERPL BCP-MCNC: 4 G/DL (ref 3.5–5)
ALP SERPL-CCNC: 50 U/L (ref 34–104)
ALT SERPL W P-5'-P-CCNC: 12 U/L (ref 7–52)
ANION GAP SERPL CALCULATED.3IONS-SCNC: 10 MMOL/L
AST SERPL W P-5'-P-CCNC: 21 U/L (ref 13–39)
BASOPHILS # BLD AUTO: 0.03 THOUSANDS/ÂΜL (ref 0–0.1)
BASOPHILS NFR BLD AUTO: 0 % (ref 0–1)
BILIRUB SERPL-MCNC: 0.55 MG/DL (ref 0.2–1)
BUN SERPL-MCNC: 21 MG/DL (ref 5–25)
CALCIUM SERPL-MCNC: 9.4 MG/DL (ref 8.4–10.2)
CHLORIDE SERPL-SCNC: 105 MMOL/L (ref 96–108)
CO2 SERPL-SCNC: 25 MMOL/L (ref 21–32)
CREAT SERPL-MCNC: 1.13 MG/DL (ref 0.6–1.3)
EOSINOPHIL # BLD AUTO: 0.11 THOUSAND/ÂΜL (ref 0–0.61)
EOSINOPHIL NFR BLD AUTO: 2 % (ref 0–6)
ERYTHROCYTE [DISTWIDTH] IN BLOOD BY AUTOMATED COUNT: 13.5 % (ref 11.6–15.1)
GFR SERPL CREATININE-BSD FRML MDRD: 55 ML/MIN/1.73SQ M
GLUCOSE SERPL-MCNC: 99 MG/DL (ref 65–140)
HCT VFR BLD AUTO: 37.3 % (ref 36.5–49.3)
HGB BLD-MCNC: 11.9 G/DL (ref 12–17)
IMM GRANULOCYTES # BLD AUTO: 0.02 THOUSAND/UL (ref 0–0.2)
IMM GRANULOCYTES NFR BLD AUTO: 0 % (ref 0–2)
LYMPHOCYTES # BLD AUTO: 2.26 THOUSANDS/ÂΜL (ref 0.6–4.47)
LYMPHOCYTES NFR BLD AUTO: 33 % (ref 14–44)
MCH RBC QN AUTO: 31.9 PG (ref 26.8–34.3)
MCHC RBC AUTO-ENTMCNC: 31.9 G/DL (ref 31.4–37.4)
MCV RBC AUTO: 100 FL (ref 82–98)
MONOCYTES # BLD AUTO: 0.64 THOUSAND/ÂΜL (ref 0.17–1.22)
MONOCYTES NFR BLD AUTO: 9 % (ref 4–12)
NEUTROPHILS # BLD AUTO: 3.73 THOUSANDS/ÂΜL (ref 1.85–7.62)
NEUTS SEG NFR BLD AUTO: 56 % (ref 43–75)
NRBC BLD AUTO-RTO: 0 /100 WBCS
PLATELET # BLD AUTO: 204 THOUSANDS/UL (ref 149–390)
PMV BLD AUTO: 9.8 FL (ref 8.9–12.7)
POTASSIUM SERPL-SCNC: 4.7 MMOL/L (ref 3.5–5.3)
PROT SERPL-MCNC: 6.7 G/DL (ref 6.4–8.4)
RBC # BLD AUTO: 3.73 MILLION/UL (ref 3.88–5.62)
SODIUM SERPL-SCNC: 140 MMOL/L (ref 135–147)
TSH SERPL DL<=0.05 MIU/L-ACNC: 3.62 UIU/ML (ref 0.45–4.5)
WBC # BLD AUTO: 6.79 THOUSAND/UL (ref 4.31–10.16)

## 2023-10-05 PROCEDURE — 93000 ELECTROCARDIOGRAM COMPLETE: CPT | Performed by: INTERNAL MEDICINE

## 2023-10-05 PROCEDURE — 80053 COMPREHEN METABOLIC PANEL: CPT

## 2023-10-05 PROCEDURE — 36415 COLL VENOUS BLD VENIPUNCTURE: CPT

## 2023-10-05 PROCEDURE — 84443 ASSAY THYROID STIM HORMONE: CPT

## 2023-10-05 PROCEDURE — 85025 COMPLETE CBC W/AUTO DIFF WBC: CPT

## 2023-10-05 PROCEDURE — 99214 OFFICE O/P EST MOD 30 MIN: CPT | Performed by: INTERNAL MEDICINE

## 2023-10-05 NOTE — PROGRESS NOTES
Progress Note- Cardiology Office  [facility]. Benita Cisse 80 y.o. male MRN: [id number]  : 1928  Unit/Bed#:  Encounter: [id number]      Assessment:     1. Dyspnea on exertion    2. Cardiac murmur    3. NSVT (nonsustained ventricular tachycardia) (HCC)    4. Palpitations    5. Anxiety        Discussion summary and Plan:  1. Palpitation and skipped beat. Patient is anxious about it. He says he feels better since he is taking atenolol 12.5 mg every other day. Findings of Holter echo reviewed with him. He does not want any stress test.  We will continue atenolol he likes it. Verna with patient's family. 2. Dyspnea on exertion. Chronic not changed. Does not want any invasive or stress test.  Or any cardiac work-up he is able to do all his activities without any issues. 3. Cardiac murmur. Patient has mild valvular disease with thickened aortic valve mild MR mild AI normal LV systolic function. Echo from  reviewed. No significant change mild valvular disease ,EF is normal.  No clinical change in his murmur. S2 is still distinctly heard    4. Anxiety. Is better now. 5. History of GERD. On on present all. 6.  Dyslipidemia. Borderline cholesterol he does not want take statin therapy. Family understand that. Not in the check cholesterol at this time. 7. Episode of NSVT. Patient asymptomatic episode of NSVT. His EF is normal he does not want ischemia workup. Continue beta-blocker. Patient's results discussed with him. Thank you for your consultation. If you have any question please call me at [phone]    Counseling :  A description of the counseling. Goals and Barriers. Patient's ability to self care: Yes  Medication side effect reviewed with patient in detail and all their questions answered to their satisfaction.       Primary Care Physician : Katja Claire MD    HPI :     Benita Cisse is a 80y.o. year old male who was referred by primary care doctor for palpitations and skipped beat. Patient who has really no significant past medical history other than GERD has been under lot of stress as he had help take care of his wife who has multiple medical problem including dementia. He has to get up multiple time at night and has not been sleeping very well noted lately he is getting skipped beat and palpitations. He felt like he is missing heartbeats and occasionally feels faster. He denies any chest pain but he gets fatigue and tired and has some exertional shortness of breath. He has no real previous cardiac history in him. He has no recent or previous cardiac workup no history of MI no history of stents no history of any other significant cardiac problems. He get dyspnea and exertional decline monitor 2 flights of stair but no chest pain. He has used to smoke quit many many years ago. He he lives with his wife. He drinks about 6 oz of wine every day occasional coffee. No other significant dietary intakes. No stimulants. He denies any history of hypertension her blood pressure was slightly found to be elevated. He has no recent surgeries. 07/13/2022. Above reviewed. Patient came for follow-up. He is under lot of stress he lost his wife. He has medical history significant for dyspnea on exertion, skipped beat, anxiety and previous history of PACs and PVCs. He has normal LV systolic function and he refused to do stress test.  His last blood test in [facility] was in 2019. Lost his wife in April 2022. He is on of feels anxious. Since then he feels palpitations. Ashley Angles He feels heart rate racing. He has not been on any medications. He does check his blood pressure most readings are 110-120 with heart rate 60-80 beats per minute today his heart rate is 79 beats per minute. EKG shows sinus rhythm LVH by voltage no other significant change. No nausea no vomiting no fever no chills.   He came with his daughter    09/26/2022. Above reviewed. Patient came for follow-up. He still under lot of stress as he lost his wife. He has medical history significant distant exertion, sick skipped beat, PACs, PVCs and short wide complex on it probably NSVT. He refused to do stress test.  His blood test in [facility] in July 2022 was acceptable. He does check his blood pressure and most readings are acceptable. He has no nausea no vomiting no fever no chills no other cardiovascular issues. He came with his daughter. He is 100% sure he does not want stress test.  His Holter from 07/27/2020 reviewed. He had an episode of NSVT which was asymptomatic. His echo Doppler shows his EF is normal and mild valvular disease. 3/16/2023. Reviewed. Patient came for follow-up he is doing well. He seems to like atenolol that has helped him with palpitation and skipped beat. He had an episode of NSVT on the Holter monitor. He does not want to do stress test.  Blood test in July 2022 was acceptable thyroid function was stable. EKG shows sinus, heart rate 69 bpm he is on atenolol 12.5 mg every other day. He does not take any other medications. Echo shows normal systolic function with mild valvular disease. He came with his daughter. No other cardiovascular issues. 10/5/2023. Above reviewed. Patient came for follow-up. He is on low-dose atenolol that has helped him tremendously. He had no more episodes of NSVT. He does not want to stress test his blood test from July 2022 were acceptable he is willing to have repeat blood test.  Today EKG shows sinus rhythm heart rate 61 bpm.  He take atenolol 12.5 every other days. No nausea no vomiting no fever no chills no other issues vitals are stable. He came with his daughter. Still lives independently he is able to do all his activities without any help.     Review of Systems   Constitutional: Negative for activity change, chills, diaphoresis, fever and unexpected weight change. HENT: Negative for congestion. Eyes: Negative for discharge and redness. Respiratory: Negative for cough, chest tightness, shortness of breath and wheezing. Cardiovascular: Negative. Negative for chest pain, palpitations and leg swelling. Gastrointestinal: Negative for abdominal pain, diarrhea and nausea. Endocrine: Negative. Genitourinary: Negative for decreased urine volume and urgency. Musculoskeletal: Positive for arthralgias. Negative for back pain and gait problem. Skin: Negative for rash and wound. Allergic/Immunologic: Negative. Neurological: Negative for dizziness, seizures, syncope, weakness, light-headedness and headaches. Hematological: Negative. Psychiatric/Behavioral: Negative for agitation and confusion. The patient is not nervous/anxious. Historical Information   History reviewed. No pertinent past medical history. History reviewed. No pertinent surgical history.   Social History     Substance and Sexual Activity   Alcohol Use Yes   • Alcohol/week: 1.0 standard drink of alcohol   • Types: 1 Glasses of wine per week    Comment: 1 glass of wine with lunch     Social History     Substance and Sexual Activity   Drug Use Not on file     Social History     Tobacco Use   Smoking Status Never   Smokeless Tobacco Never     Family History:   Family History   Problem Relation Age of Onset   • No Known Problems Mother    • No Known Problems Father        Meds/Allergies     No Known Allergies    Current Outpatient Medications:   •  aspirin (ECOTRIN LOW STRENGTH) 81 mg EC tablet, Take 81 mg by mouth daily, Disp: , Rfl:   •  atenolol (TENORMIN) 25 mg tablet, Take 0.5 tablets (12.5 mg total) by mouth every other day, Disp: 45 tablet, Rfl: 3  •  omeprazole (PriLOSEC) 20 mg delayed release capsule, Take 20 mg by mouth daily, Disp: , Rfl:   •  Melatonin 5 MG CAPS, Take 5 mg by mouth (Patient not taking: Reported on 3/16/2023), Disp: , Rfl:     Vitals: Blood pressure 128/68, pulse 61, height 5' 6" (1.676 m), weight 61.2 kg (135 lb), SpO2 98 %. ?  Body mass index is 21.79 kg/m². Vitals:    10/05/23 1130   Weight: 61.2 kg (135 lb)     BP Readings from Last 3 Encounters:   10/05/23 128/68   03/16/23 130/60   09/26/22 140/60         Physical Exam  Constitutional:       General: He is not in acute distress. Appearance: He is well-developed. He is not diaphoretic. Neck:      Thyroid: No thyromegaly. Vascular: No JVD. Trachea: No tracheal deviation. Cardiovascular:      Rate and Rhythm: Normal rate and regular rhythm. Heart sounds: S1 normal and S2 normal. Heart sounds not distant. Murmur heard. Systolic (ejection) murmur is present with a grade of 2/6. No friction rub. No gallop. No S3 or S4 sounds. Pulmonary:      Effort: Pulmonary effort is normal. No respiratory distress. Breath sounds: Normal breath sounds. No wheezing or rales. Chest:      Chest wall: No tenderness. Abdominal:      General: Bowel sounds are normal. There is no distension. Palpations: Abdomen is soft. Tenderness: There is no abdominal tenderness. Musculoskeletal:         General: No deformity. Cervical back: Neck supple. Skin:     General: Skin is warm and dry. Coloration: Skin is not pale. Findings: No rash. Neurological:      Mental Status: He is alert and oriented to person, place, and time. Psychiatric:         Behavior: Behavior normal.         Judgment: Judgment normal.           Diagnostic Studies Review Cardio:    Echo Doppler. Echo Doppler done 11/13/2019 shows EF 55 60%, mild LVH, grade 1 diastolic dysfunction, mild aortic regurgitation mild MR and PA pressure was normal.    Holter monitor. Holter monitor shows no evidence of atrial fibrillation flutter. There were few episodes of PVCs which were asymptomatic. Episode of NSVT noted.   Study done on July 2022    Stress test.  Patient refused to do stress test.    EKG:  Twelve lead EKG 10/08/2019 shows normal sinus rhythm. LVH by voltage. Heart rate 72 beats per minute. No other significant ST changes. Twelve lead EKG 07/09/2020 shows normal sinus rhythm. LVH by voltage heart rate 72 beats per minute no change from old EKG. Twelve lead EKG done 01/15/2021 shows sinus rhythm with few PACs heart rate 76 beats per minute no change from old EKG. Twelve lead EKG done on 07/13/2022 shows normal sinus rhythm heart rate 79 beats per minute. Twelve-lead EKG 3/16/2023 shows normal sinus some heart rate 69 bpm LVH by voltage no change from previous EKG. Twelve-lead EKG done on 10/5/2023 shows normal sinus rhythm heart rate 61 bpm normal intervals. LVH by voltage no change from previous EKG    Lab Review   Lab Results   Component Value Date    WBC 6.80 07/15/2022    HGB 12.5 07/15/2022    HCT 39.3 07/15/2022    MCV 99 (H) 07/15/2022    RDW 12.9 07/15/2022     07/15/2022     BMP:  Lab Results   Component Value Date    SODIUM 138 07/15/2022    K 3.9 07/15/2022     07/15/2022    CO2 28 07/15/2022    BUN 23 07/15/2022    CREATININE 1.14 07/15/2022    GLUC 118 01/05/2017    GLUF 89 07/15/2022    CALCIUM 9.5 07/15/2022    EGFR 55 07/15/2022    MG 1.9 01/05/2017     LFT:  Lab Results   Component Value Date    AST 21 07/15/2022    ALT 23 07/15/2022    ALKPHOS 52 07/15/2022    TP 6.9 07/15/2022    ALB 3.8 07/15/2022      Lab Results   Component Value Date    [id number] 2.300 07/15/2022       Lipid Profile:   Lab Results   Component Value Date    CHOLESTEROL 178 12/16/2019    HDL 63 12/16/2019    LDLCALC 98 12/16/2019    TRIG 84 12/16/2019     Lab Results   Component Value Date    CHOLESTEROL 178 12/16/2019    CHOLESTEROL 197 01/05/2017           Dr. Alvina Larsen MD [facility] [place]      "This note has been constructed using a voice recognition system. Therefore there may be syntax, spelling, and/or grammatical errors. Please call if you have any questions. "

## 2023-10-09 ENCOUNTER — TELEPHONE (OUTPATIENT)
Dept: CARDIOLOGY CLINIC | Facility: CLINIC | Age: 88
End: 2023-10-09

## 2023-10-09 NOTE — TELEPHONE ENCOUNTER
----- Message from Louis Meyer MD sent at 10/6/2023 12:26 PM EDT -----  Patient blood test reviewed. They are acceptable. Will continue same medication. Patient should keep his appointment for follow-up.

## 2024-05-13 ENCOUNTER — OFFICE VISIT (OUTPATIENT)
Dept: CARDIOLOGY CLINIC | Facility: CLINIC | Age: 89
End: 2024-05-13
Payer: MEDICARE

## 2024-05-13 VITALS
OXYGEN SATURATION: 99 % | WEIGHT: 131 LBS | DIASTOLIC BLOOD PRESSURE: 70 MMHG | HEART RATE: 60 BPM | HEIGHT: 66 IN | SYSTOLIC BLOOD PRESSURE: 100 MMHG | BODY MASS INDEX: 21.05 KG/M2

## 2024-05-13 DIAGNOSIS — R00.2 PALPITATIONS: Primary | ICD-10-CM

## 2024-05-13 DIAGNOSIS — I47.29 NSVT (NONSUSTAINED VENTRICULAR TACHYCARDIA) (HCC): ICD-10-CM

## 2024-05-13 DIAGNOSIS — R01.1 CARDIAC MURMUR: ICD-10-CM

## 2024-05-13 DIAGNOSIS — R06.09 DYSPNEA ON EXERTION: ICD-10-CM

## 2024-05-13 PROCEDURE — 93000 ELECTROCARDIOGRAM COMPLETE: CPT | Performed by: PHYSICIAN ASSISTANT

## 2024-05-13 PROCEDURE — 99214 OFFICE O/P EST MOD 30 MIN: CPT | Performed by: PHYSICIAN ASSISTANT

## 2024-05-13 NOTE — PROGRESS NOTES
"  Progress Note - Cardiology Office  [facility]    Pancho Prajapati 89 y.o. male MRN: [id number]  : 1928  Encounter: [id number]      Assessment:     Palpitations.  NSVT.  Valvular heart disease.  Dyslipidemia.  GERD.    Discussion Summary and Plan:      Palpitations.  - Patient reports over the past 2 weeks he has noted daily palpitations. He states palpitations start usually after lunch and are continuous until the evening. He describes his palpitations as feeling \"like skipped beats\". He denies his palpitations are associated chest pain, shortness of breath, lightheadedness, dizziness, pre-syncope, nausea, or vomiting.   - 24: sinus rhythm with 1st degree AV block and PVCs, 60 bpm.  Minimal voltage criteria for LVH, may be normal variant ( R in aVL).  Possible anterior septal infarct, age undetermined.  - 22 holter monitor- 48 hours:  Average heart rate was  56 BPM, minimum heart rate was  at  42 BPM. and maximum heart rate was at  92 BPM.  Ventricular ectopic activity consisted of  54 beats, which comprises  0 % of total beats.   Most of the PVCs were  singles however patient had 1 episode of nonsustained ventricular tachycardia with heart rate 130 beats per minute.  It was for 14 beats.  Supraventricular ectopic activity consisted of  368 beats, which comprises  0.2 % of total beats.  Most of the PACs were single. The patient's rhythm included  36 hours of bradycardia.  The slowest single episode of bradycardia occurred at  5:38 a.m. , lasting  28 minute and 10 seconds , with a minimum heart rate  42 BPM.  - 22 TTE: LVEF 65%. Diastolic function is normal.  Left atrium mildly dilated.  Aortic valve with trace to mild regurgitation.  Mild aortic valve stenosis.  Mild mitral valve regurgitation.  Mild tricuspid valve regurgitation. The right ventricular systolic pressure is mildly elevated at 35.47 mmHg.   - Currently on atenolol 12.5 mg every other day. There is " no room to titrate up due to soft BP.   - 48 hour holter monitor.     NSVT.  - 7/27/22 holter monitor- 48 hours:  Average heart rate was  56 BPM, minimum heart rate was  at  42 BPM. and maximum heart rate was at  92 BPM.  Ventricular ectopic activity consisted of  54 beats, which comprises  0 % of total beats.   Most of the PVCs were  singles however patient had 1 episode of nonsustained ventricular tachycardia with heart rate 130 beats per minute.  It was for 14 beats.  Supraventricular ectopic activity consisted of  368 beats, which comprises  0.2 % of total beats.  Most of the PACs were single. The patient's rhythm included  36 hours of bradycardia.  The slowest single episode of bradycardia occurred at  5:38 a.m. , lasting  28 minute and 10 seconds , with a minimum heart rate  42 BPM.  - Currently on atenolol 12.5 mg every other day.     Valvular heart disease.  - Aortic valve with trace to mild regurgitation.  Aortic valve with mild stenosis.  Mitral valve with mild regurgitation.  Tricuspid valve with mild regurgitation.  - 7/27/22 TTE:   Aortic Valve The aortic valve is trileaflet. The leaflets are moderately thickened. The leaflets are moderately calcified. There is mildly reduced mobility. There is trace to mild regurgitation. There is mild stenosis.   Mitral Valve There is mild regurgitation. There is no evidence of stenosis. .   Tricuspid Valve Tricuspid valve structure is normal. There is mild regurgitation. There is no evidence of stenosis. The right ventricular systolic pressure is mildly elevated at 35.47 mmHg.     Dyslipidemia.  - Patient declines statin therapy.   - 12/16/19 lipid panel: cholesterol 178, triglycerides 84, HDL 63, LDL 98.    - No further lipid panel checks.     GERD.  - Care per PCP.       Patient / Caretaker was advised and educated to call our office  immediately if  patient has any new symptoms of chest pain/shortness of breath, near-syncope, syncope, light headedness sustained  "palpitations  or any other cardiovascular symptoms before their scheduled follow-up appointment.  Office number was provided #[phone].    Please call [phone] if any questions.  Counseling :    A description of the counseling.  Goals and Barriers.  Patient's ability to self care: Yes  Medication side effect reviewed with patient in detail and all their questions answered to their satisfaction.    HPI :     Pancho Prajapati is a 89 y.o. male with PMHx of palpitations, NSVT, valvular heart disease, dyslipidemia, GERD, who presents for routine outpatient cardiology follow-up.     Patient was last seen in outpatient cardiology office on 10/5/23. Patient reports over the past 2 weeks he has noted daily palpitations. He states palpitations start usually after lunch and are continuous until the evening. He describes his palpitations as feeling \"like skipped beats\". He denies his palpitations are associated chest pain, shortness of breath, lightheadedness, dizziness, pre-syncope, nausea, or vomiting.       Review of Systems   Constitutional:  Negative for activity change, appetite change, chills, diaphoresis, fatigue, fever and unexpected weight change.   Respiratory:  Negative for cough, chest tightness, shortness of breath and wheezing.    Cardiovascular:  Positive for palpitations. Negative for chest pain and leg swelling.   Gastrointestinal:  Negative for abdominal distention, abdominal pain, diarrhea, nausea and vomiting.   Neurological:  Negative for dizziness, syncope, weakness, light-headedness, numbness and headaches.       Historical Information   History reviewed. No pertinent past medical history.  History reviewed. No pertinent surgical history.  Social History     Substance and Sexual Activity   Alcohol Use Yes    Alcohol/week: 1.0 standard drink of alcohol    Types: 1 Glasses of wine per week    Comment: 1 glass of wine with lunch     Social History     Substance and Sexual Activity   Drug Use " "Not on file     Social History     Tobacco Use   Smoking Status Never   Smokeless Tobacco Never     Family History:   Family History   Problem Relation Age of Onset    No Known Problems Mother     No Known Problems Father        Meds/Allergies     No Known Allergies    Current Outpatient Medications:     aspirin (ECOTRIN LOW STRENGTH) 81 mg EC tablet, Take 81 mg by mouth daily, Disp: , Rfl:     atenolol (TENORMIN) 25 mg tablet, Take 0.5 tablets (12.5 mg total) by mouth every other day, Disp: 45 tablet, Rfl: 3    omeprazole (PriLOSEC) 20 mg delayed release capsule, Take 20 mg by mouth daily, Disp: , Rfl:     Melatonin 5 MG CAPS, Take 5 mg by mouth (Patient not taking: Reported on 3/16/2023), Disp: , Rfl:     Vitals: Blood pressure 100/70, pulse 60, height 5' 6\" (1.676 m), weight 59.4 kg (131 lb), SpO2 99%.    Body mass index is 21.14 kg/m².  Wt Readings from Last 3 Encounters:   24 59.4 kg (131 lb)   10/05/23 61.2 kg (135 lb)   23 61.2 kg (135 lb)     Vitals:    24 1030   Weight: 59.4 kg (131 lb)     BP Readings from Last 3 Encounters:   24 100/70   10/05/23 128/68   23 130/60       Physical Exam:  Physical Exam  Vitals reviewed.   Constitutional:       General: He is not in acute distress.  Cardiovascular:      Rate and Rhythm: Normal rate and regular rhythm.      Pulses: Normal pulses.      Heart sounds: Murmur heard.   Pulmonary:      Effort: Pulmonary effort is normal. No respiratory distress.      Breath sounds: Normal breath sounds.   Abdominal:      General: Abdomen is flat. There is no distension.      Palpations: Abdomen is soft.      Tenderness: There is no abdominal tenderness.   Musculoskeletal:      Right lower leg: No edema.      Left lower leg: No edema.   Skin:     General: Skin is warm and dry.   Neurological:      Mental Status: He is alert and oriented to person, place, and time.       Diagnostic Studies Review Cardio:      EK/13/24: sinus rhythm with 1st degree " AV block and PVCs, 60 bpm.  Minimal voltage criteria for LVH, may be normal variant ( R in aVL).  Possible anterior septal infarct, age undetermined.    Cardiac testing:   Echo complete with contrast if indicated  Result date: 7/27/22    Left Ventricle Left ventricular cavity size is normal. Wall thickness is normal. Systolic function is normal.  Estimated ejection fraction is 65%. Wall motion is normal. Diastolic function is normal.   Right Ventricle Right ventricular cavity size is normal. Systolic function is normal. Wall thickness is normal.   Left Atrium The atrium is mildly dilated.   Right Atrium The atrium is normal in size.   Aortic Valve The aortic valve is trileaflet. The leaflets are moderately thickened. The leaflets are moderately calcified. There is mildly reduced mobility. There is trace to mild regurgitation. There is mild stenosis.   Mitral Valve There is mild regurgitation. There is no evidence of stenosis. .   Tricuspid Valve Tricuspid valve structure is normal. There is mild regurgitation. There is no evidence of stenosis. The right ventricular systolic pressure is mildly elevated at 35.47 mmHg.   Pulmonic Valve Pulmonic valve structure is normal. There is no evidence of regurgitation. There is no evidence of stenosis.   Ascending Aorta The aortic root is mildly dilated.  Consider other imaging modalities for more accurate sizing.   IVC/SVC The inferior vena cava is not well visualized.   Pericardium There is no pericardial effusion. The pericardium is normal in appearance.       Lab Review   Lab Results   Component Value Date    WBC 6.79 10/05/2023    HGB 11.9 (L) 10/05/2023    HCT 37.3 10/05/2023     (H) 10/05/2023    RDW 13.5 10/05/2023     10/05/2023     BMP:  Lab Results   Component Value Date    SODIUM 140 10/05/2023    K 4.7 10/05/2023     10/05/2023    CO2 25 10/05/2023    BUN 21 10/05/2023    CREATININE 1.13 10/05/2023    GLUC 99 10/05/2023    GLUF 89 07/15/2022     CALCIUM 9.4 10/05/2023    EGFR 55 10/05/2023    MG 1.9 01/05/2017     LFT:  Lab Results   Component Value Date    AST 21 10/05/2023    ALT 12 10/05/2023    ALKPHOS 50 10/05/2023    TP 6.7 10/05/2023    ALB 4.0 10/05/2023      Lab Results   Component Value Date    PAK1GEUBQLSG 3.621 10/05/2023     Lipid Profile:   Lab Results   Component Value Date    CHOLESTEROL 178 12/16/2019    HDL 63 12/16/2019    LDLCALC 98 12/16/2019    TRIG 84 12/16/2019       Ct Stockton PA-C

## 2024-05-24 ENCOUNTER — HOSPITAL ENCOUNTER (OUTPATIENT)
Dept: NON INVASIVE DIAGNOSTICS | Facility: HOSPITAL | Age: 89
Discharge: HOME | End: 2024-05-24
Payer: MEDICARE

## 2024-05-24 DIAGNOSIS — R00.2 PALPITATIONS: ICD-10-CM

## 2024-05-24 PROCEDURE — 93225 XTRNL ECG REC<48 HRS REC: CPT

## 2024-05-24 PROCEDURE — 93226 XTRNL ECG REC<48 HR SCAN A/R: CPT

## 2024-05-30 ENCOUNTER — TELEPHONE (OUTPATIENT)
Dept: CARDIOLOGY CLINIC | Facility: CLINIC | Age: 89
End: 2024-05-30

## 2024-05-30 NOTE — TELEPHONE ENCOUNTER
----- Message from Ct Stockton PA-C sent at 5/29/2024  5:09 PM EDT -----  Can you please inform the patient that the results of his Holter monitor have been reviewed.  We note that his overall rhythm is normal but do note episodes of extra beats.  At this time we will continue his current medication regimen, there is not much room to titrate up his atenolol as his BP has been on the lower side.  Recommend continuing current medication regimen.  ----- Message -----  From: Ryne Sun MD  Sent: 5/29/2024   3:28 PM EDT  To: Ct Stockton PA-C

## 2024-06-16 DIAGNOSIS — R00.2 PALPITATIONS: ICD-10-CM

## 2024-06-16 RX ORDER — ATENOLOL 25 MG/1
12.5 TABLET ORAL EVERY OTHER DAY
Qty: 24 TABLET | Refills: 1 | Status: SHIPPED | OUTPATIENT
Start: 2024-06-16

## 2024-12-09 ENCOUNTER — OFFICE VISIT (OUTPATIENT)
Dept: CARDIOLOGY CLINIC | Facility: CLINIC | Age: 89
End: 2024-12-09
Payer: MEDICARE

## 2024-12-09 VITALS
BODY MASS INDEX: 20.73 KG/M2 | WEIGHT: 129 LBS | HEART RATE: 59 BPM | OXYGEN SATURATION: 100 % | DIASTOLIC BLOOD PRESSURE: 70 MMHG | SYSTOLIC BLOOD PRESSURE: 140 MMHG | HEIGHT: 66 IN

## 2024-12-09 DIAGNOSIS — R00.2 PALPITATIONS: Primary | ICD-10-CM

## 2024-12-09 DIAGNOSIS — I47.29 NSVT (NONSUSTAINED VENTRICULAR TACHYCARDIA) (HCC): ICD-10-CM

## 2024-12-09 PROCEDURE — 99202 OFFICE O/P NEW SF 15 MIN: CPT | Performed by: INTERNAL MEDICINE

## 2024-12-09 PROCEDURE — 93000 ELECTROCARDIOGRAM COMPLETE: CPT | Performed by: INTERNAL MEDICINE

## 2024-12-09 RX ORDER — ATENOLOL 25 MG/1
12.5 TABLET ORAL EVERY OTHER DAY
Qty: 23 TABLET | Refills: 1 | Status: SHIPPED | OUTPATIENT
Start: 2024-12-09

## 2024-12-09 NOTE — PROGRESS NOTES
[facility]  [address]  [address],   [place] [address]      Pancho Prajapati  12/11/1928  [id number]    Assessment & Plan  Palpitations  Patient without c/o of palpitations. No dizziness or lightheadedness.   NSVT (nonsustained ventricular tachycardia) (HCC)  Stable Holter 5/28/24 no NSVT.        Pancho Prajapati is a 89 y.o. male who presents for routine f/u of palpitations. He is doing well with no c/o of palpitations, chest pain or shortness of breath. He had an echo 2019 with LVEF 55-60% no wall motion abnormalities, mild concentric LVH, mild MR. He is following his daily exercise regimen and otherwise doing well. He deferred blood testing today. In 5/28/24 Holter Monitor avg HR 52 PVCs and PACs and no NSVT. He  will be seen in routine f/u in 6 mo. He continues Atenolol 12.5mg qod.      Continue all medications. Previous studies reviewed with patient, medications reviewed and possible side effects discussed. Continue risk factor modification. Optimize weight, regular exercise and follow up with appropriate specialists and primary care physician as discussed.  All questions answered. Patient advised to report any problems prompting to medical attention. Return for follow up visit in 6mo or earlier if needed    Chief Complaint   Patient presents with    Follow-up     Pt denies any cardiac complaints.           Objective           PMH:     Patient Active Problem List   Diagnosis    Skipped beats    Cardiac murmur    Anxiety    Palpitations    Gastroesophageal reflux disease without esophagitis    Dyspnea on exertion    NSVT (nonsustained ventricular tachycardia) (HCC)     No past medical history on file.  Social History     Socioeconomic History    Marital status: Unknown     Spouse name: Not on file    Number of children: Not on file    Years of education: Not on file    Highest education level: Not on file   Occupational History    Not on file   Tobacco Use    Smoking  status: Never    Smokeless tobacco: Never   Substance and Sexual Activity    Alcohol use: Yes     Alcohol/week: 1.0 standard drink of alcohol     Types: 1 Glasses of wine per week     Comment: 1 glass of wine with lunch    Drug use: Not on file    Sexual activity: Not on file   Other Topics Concern    Not on file   Social History Narrative    Not on file     Social Drivers of Health     Financial Resource Strain: Not on file   Food Insecurity: Not on file   Transportation Needs: Not on file   Physical Activity: Not on file   Stress: Not on file   Social Connections: Not on file   Intimate Partner Violence: Not on file   Housing Stability: Not on file      Family History   Problem Relation Age of Onset    No Known Problems Mother     No Known Problems Father      No past surgical history on file.    Current Outpatient Medications:     aspirin (ECOTRIN LOW STRENGTH) 81 mg EC tablet, Take 81 mg by mouth daily, Disp: , Rfl:     atenolol (TENORMIN) 25 mg tablet, TAKE 1/2 TABLET BY MOUTH EVERY OTHER DAY, Disp: 24 tablet, Rfl: 1    omeprazole (PriLOSEC) 20 mg delayed release capsule, Take 20 mg by mouth daily, Disp: , Rfl:     Melatonin 5 MG CAPS, Take 5 mg by mouth (Patient not taking: Reported on 3/16/2023), Disp: , Rfl:   No Known Allergies      Review of Systems   Constitutional:  Negative for chills and fever.   HENT:  Negative for ear pain and sore throat.    Eyes:  Negative for pain and visual disturbance.   Respiratory:  Negative for cough and shortness of breath.    Cardiovascular:  Positive for palpitations. Negative for chest pain.   Gastrointestinal:  Negative for abdominal pain and vomiting.   Genitourinary:  Negative for dysuria and hematuria.   Musculoskeletal:  Negative for arthralgias and back pain.   Skin:  Negative for color change and rash.   Neurological:  Negative for seizures and syncope.   All other systems reviewed and are negative.      /70 (BP Location: Right arm, Patient Position: Sitting,  "Cuff Size: Standard)   Pulse 59   Ht 5' 6\" (1.676 m)   Wt 58.5 kg (129 lb)   SpO2 100%   BMI 20.82 kg/m²     Physical Exam  Constitutional:       Appearance: Normal appearance.   HENT:      Head: Normocephalic and atraumatic.   Cardiovascular:      Rate and Rhythm: Normal rate and regular rhythm.      Pulses: Normal pulses.      Heart sounds: Murmur heard.   Pulmonary:      Effort: Pulmonary effort is normal.      Breath sounds: Normal breath sounds.   Musculoskeletal:         General: Normal range of motion.      Cervical back: Normal range of motion and neck supple.   Skin:     General: Skin is warm and dry.   Neurological:      General: No focal deficit present.      Mental Status: He is alert and oriented to person, place, and time.   Psychiatric:         Mood and Affect: Mood normal.         Behavior: Behavior normal.       "

## 2025-08-06 ENCOUNTER — OFFICE VISIT (OUTPATIENT)
Dept: CARDIOLOGY CLINIC | Facility: CLINIC | Age: OVER 89
End: 2025-08-06
Payer: MEDICARE

## 2025-08-06 VITALS
SYSTOLIC BLOOD PRESSURE: 140 MMHG | HEART RATE: 62 BPM | DIASTOLIC BLOOD PRESSURE: 80 MMHG | BODY MASS INDEX: 21.53 KG/M2 | WEIGHT: 134 LBS | HEIGHT: 66 IN | OXYGEN SATURATION: 98 %

## 2025-08-06 DIAGNOSIS — R01.1 CARDIAC MURMUR: ICD-10-CM

## 2025-08-06 DIAGNOSIS — R06.09 DYSPNEA ON EXERTION: ICD-10-CM

## 2025-08-06 DIAGNOSIS — I47.29 NSVT (NONSUSTAINED VENTRICULAR TACHYCARDIA) (HCC): ICD-10-CM

## 2025-08-06 DIAGNOSIS — F41.9 ANXIETY: ICD-10-CM

## 2025-08-06 DIAGNOSIS — R00.2 PALPITATIONS: ICD-10-CM

## 2025-08-06 PROCEDURE — 99214 OFFICE O/P EST MOD 30 MIN: CPT | Performed by: INTERNAL MEDICINE

## 2025-08-06 PROCEDURE — 93000 ELECTROCARDIOGRAM COMPLETE: CPT | Performed by: INTERNAL MEDICINE

## 2025-08-18 DIAGNOSIS — R00.2 PALPITATIONS: ICD-10-CM

## 2025-08-18 RX ORDER — ATENOLOL 25 MG/1
12.5 TABLET ORAL EVERY OTHER DAY
Qty: 23 TABLET | Refills: 1 | Status: SHIPPED | OUTPATIENT
Start: 2025-08-18